# Patient Record
Sex: FEMALE | Race: WHITE | NOT HISPANIC OR LATINO | Employment: PART TIME | URBAN - METROPOLITAN AREA
[De-identification: names, ages, dates, MRNs, and addresses within clinical notes are randomized per-mention and may not be internally consistent; named-entity substitution may affect disease eponyms.]

---

## 2017-05-04 ENCOUNTER — OFFICE VISIT (OUTPATIENT)
Dept: FAMILY MEDICINE CLINIC | Facility: CLINIC | Age: 47
End: 2017-05-04

## 2017-05-04 VITALS
DIASTOLIC BLOOD PRESSURE: 80 MMHG | TEMPERATURE: 97.8 F | OXYGEN SATURATION: 99 % | SYSTOLIC BLOOD PRESSURE: 100 MMHG | HEART RATE: 82 BPM | WEIGHT: 125 LBS

## 2017-05-04 DIAGNOSIS — J01.00 ACUTE MAXILLARY SINUSITIS, RECURRENCE NOT SPECIFIED: Primary | ICD-10-CM

## 2017-05-04 DIAGNOSIS — R31.9 HEMATURIA: ICD-10-CM

## 2017-05-04 DIAGNOSIS — N39.0 ACUTE UTI: ICD-10-CM

## 2017-05-04 DIAGNOSIS — R30.9 PAIN WITH URINATION: ICD-10-CM

## 2017-05-04 DIAGNOSIS — R11.0 NAUSEA: ICD-10-CM

## 2017-05-04 DIAGNOSIS — N91.2 AMENORRHEA: ICD-10-CM

## 2017-05-04 LAB
B-HCG UR QL: NEGATIVE
BILIRUB BLD-MCNC: NEGATIVE MG/DL
CLARITY, POC: ABNORMAL
COLOR UR: ABNORMAL
GLUCOSE UR STRIP-MCNC: NEGATIVE MG/DL
INTERNAL NEGATIVE CONTROL: NEGATIVE
INTERNAL POSITIVE CONTROL: POSITIVE
KETONES UR QL: NEGATIVE
LEUKOCYTE EST, POC: ABNORMAL
Lab: NORMAL
NITRITE UR-MCNC: NEGATIVE MG/ML
PH UR: 6 [PH] (ref 5–8)
PROT UR STRIP-MCNC: NEGATIVE MG/DL
RBC # UR STRIP: ABNORMAL /UL
SP GR UR: 1.02 (ref 1–1.03)
UROBILINOGEN UR QL: ABNORMAL

## 2017-05-04 PROCEDURE — 81025 URINE PREGNANCY TEST: CPT | Performed by: PHYSICIAN ASSISTANT

## 2017-05-04 PROCEDURE — 81003 URINALYSIS AUTO W/O SCOPE: CPT | Performed by: PHYSICIAN ASSISTANT

## 2017-05-04 PROCEDURE — 99204 OFFICE O/P NEW MOD 45 MIN: CPT | Performed by: PHYSICIAN ASSISTANT

## 2017-05-04 PROCEDURE — 96372 THER/PROPH/DIAG INJ SC/IM: CPT | Performed by: PHYSICIAN ASSISTANT

## 2017-05-04 RX ORDER — CEFTRIAXONE 1 G/1
1 INJECTION, POWDER, FOR SOLUTION INTRAMUSCULAR; INTRAVENOUS EVERY 24 HOURS
Status: DISCONTINUED | OUTPATIENT
Start: 2017-05-04 | End: 2019-03-18

## 2017-05-04 RX ORDER — CEFDINIR 300 MG/1
300 CAPSULE ORAL 2 TIMES DAILY
Qty: 14 CAPSULE | Refills: 0 | Status: SHIPPED | OUTPATIENT
Start: 2017-05-04 | End: 2018-08-07

## 2017-05-04 RX ADMIN — CEFTRIAXONE 1 G: 1 INJECTION, POWDER, FOR SOLUTION INTRAMUSCULAR; INTRAVENOUS at 10:07

## 2018-08-07 ENCOUNTER — LAB (OUTPATIENT)
Dept: LAB | Facility: HOSPITAL | Age: 48
End: 2018-08-07

## 2018-08-07 ENCOUNTER — OFFICE VISIT (OUTPATIENT)
Dept: FAMILY MEDICINE CLINIC | Facility: CLINIC | Age: 48
End: 2018-08-07

## 2018-08-07 VITALS
DIASTOLIC BLOOD PRESSURE: 76 MMHG | WEIGHT: 124 LBS | SYSTOLIC BLOOD PRESSURE: 122 MMHG | BODY MASS INDEX: 21.17 KG/M2 | HEART RATE: 78 BPM | OXYGEN SATURATION: 99 % | TEMPERATURE: 98.2 F | HEIGHT: 64 IN

## 2018-08-07 DIAGNOSIS — Z98.890 HX OF LOCAL EXCISION OF SKIN LESION: ICD-10-CM

## 2018-08-07 DIAGNOSIS — Z00.00 ANNUAL PHYSICAL EXAM: ICD-10-CM

## 2018-08-07 DIAGNOSIS — Z82.49 FAMILY HISTORY OF HEART DISEASE: ICD-10-CM

## 2018-08-07 DIAGNOSIS — N95.1 PERIMENOPAUSAL SYMPTOMS: ICD-10-CM

## 2018-08-07 DIAGNOSIS — G43.809 OTHER MIGRAINE WITHOUT STATUS MIGRAINOSUS, NOT INTRACTABLE: ICD-10-CM

## 2018-08-07 DIAGNOSIS — Z00.00 ANNUAL PHYSICAL EXAM: Primary | ICD-10-CM

## 2018-08-07 LAB
ANION GAP SERPL CALCULATED.3IONS-SCNC: 6 MMOL/L (ref 3–11)
ARTICHOKE IGE QN: 159 MG/DL (ref 0–130)
BASOPHILS # BLD AUTO: 0.04 10*3/MM3 (ref 0–0.2)
BASOPHILS NFR BLD AUTO: 0.6 % (ref 0–1)
BILIRUB BLD-MCNC: NEGATIVE MG/DL
BUN BLD-MCNC: 12 MG/DL (ref 9–23)
BUN/CREAT SERPL: 12.8 (ref 7–25)
CALCIUM SPEC-SCNC: 9.5 MG/DL (ref 8.7–10.4)
CHLORIDE SERPL-SCNC: 107 MMOL/L (ref 99–109)
CHOLEST SERPL-MCNC: 238 MG/DL (ref 0–200)
CLARITY, POC: CLEAR
CO2 SERPL-SCNC: 28 MMOL/L (ref 20–31)
COLOR UR: YELLOW
CREAT BLD-MCNC: 0.94 MG/DL (ref 0.6–1.3)
DEPRECATED RDW RBC AUTO: 43.7 FL (ref 37–54)
EOSINOPHIL # BLD AUTO: 0.06 10*3/MM3 (ref 0–0.3)
EOSINOPHIL NFR BLD AUTO: 0.9 % (ref 0–3)
ERYTHROCYTE [DISTWIDTH] IN BLOOD BY AUTOMATED COUNT: 12.7 % (ref 11.3–14.5)
ESTRADIOL SERPL HS-MCNC: 27 PG/ML
GFR SERPL CREATININE-BSD FRML MDRD: 64 ML/MIN/1.73
GLUCOSE BLD-MCNC: 95 MG/DL (ref 70–100)
GLUCOSE UR STRIP-MCNC: NEGATIVE MG/DL
HCT VFR BLD AUTO: 42.2 % (ref 34.5–44)
HDLC SERPL-MCNC: 77 MG/DL (ref 40–60)
HGB BLD-MCNC: 13.6 G/DL (ref 11.5–15.5)
IMM GRANULOCYTES # BLD: 0 10*3/MM3 (ref 0–0.03)
IMM GRANULOCYTES NFR BLD: 0 % (ref 0–0.6)
KETONES UR QL: NEGATIVE
LEUKOCYTE EST, POC: NEGATIVE
LYMPHOCYTES # BLD AUTO: 2.22 10*3/MM3 (ref 0.6–4.8)
LYMPHOCYTES NFR BLD AUTO: 33.9 % (ref 24–44)
MCH RBC QN AUTO: 30.2 PG (ref 27–31)
MCHC RBC AUTO-ENTMCNC: 32.2 G/DL (ref 32–36)
MCV RBC AUTO: 93.8 FL (ref 80–99)
MONOCYTES # BLD AUTO: 0.46 10*3/MM3 (ref 0–1)
MONOCYTES NFR BLD AUTO: 7 % (ref 0–12)
NEUTROPHILS # BLD AUTO: 3.77 10*3/MM3 (ref 1.5–8.3)
NEUTROPHILS NFR BLD AUTO: 57.6 % (ref 41–71)
NITRITE UR-MCNC: NEGATIVE MG/ML
PH UR: 5 [PH] (ref 5–8)
PLATELET # BLD AUTO: 229 10*3/MM3 (ref 150–450)
PMV BLD AUTO: 12 FL (ref 6–12)
POTASSIUM BLD-SCNC: 4.2 MMOL/L (ref 3.5–5.5)
PROT UR STRIP-MCNC: NEGATIVE MG/DL
RBC # BLD AUTO: 4.5 10*6/MM3 (ref 3.89–5.14)
RBC # UR STRIP: NEGATIVE /UL
SODIUM BLD-SCNC: 141 MMOL/L (ref 132–146)
SP GR UR: 1.02 (ref 1–1.03)
TRIGL SERPL-MCNC: 84 MG/DL (ref 0–150)
TSH SERPL DL<=0.05 MIU/L-ACNC: 1.67 MIU/ML (ref 0.35–5.35)
UROBILINOGEN UR QL: NORMAL
WBC NRBC COR # BLD: 6.55 10*3/MM3 (ref 3.5–10.8)

## 2018-08-07 PROCEDURE — 36415 COLL VENOUS BLD VENIPUNCTURE: CPT

## 2018-08-07 PROCEDURE — 93000 ELECTROCARDIOGRAM COMPLETE: CPT | Performed by: PHYSICIAN ASSISTANT

## 2018-08-07 PROCEDURE — 84443 ASSAY THYROID STIM HORMONE: CPT

## 2018-08-07 PROCEDURE — 80061 LIPID PANEL: CPT

## 2018-08-07 PROCEDURE — 82670 ASSAY OF TOTAL ESTRADIOL: CPT | Performed by: PHYSICIAN ASSISTANT

## 2018-08-07 PROCEDURE — 80048 BASIC METABOLIC PNL TOTAL CA: CPT

## 2018-08-07 PROCEDURE — 81003 URINALYSIS AUTO W/O SCOPE: CPT | Performed by: PHYSICIAN ASSISTANT

## 2018-08-07 PROCEDURE — 85025 COMPLETE CBC W/AUTO DIFF WBC: CPT

## 2018-08-07 PROCEDURE — 99396 PREV VISIT EST AGE 40-64: CPT | Performed by: PHYSICIAN ASSISTANT

## 2018-08-07 RX ORDER — MULTIVITAMIN WITH IRON
TABLET ORAL
COMMUNITY
End: 2019-03-07

## 2018-08-07 RX ORDER — GRAPE SEED EXT/BIOFLAV,CITRUS 50MG-250MG
CAPSULE ORAL
COMMUNITY
End: 2019-06-19

## 2018-08-07 RX ORDER — RIZATRIPTAN BENZOATE 10 MG/1
10 TABLET ORAL ONCE AS NEEDED
Qty: 10 TABLET | Refills: 11 | Status: SHIPPED | OUTPATIENT
Start: 2018-08-07 | End: 2020-03-09 | Stop reason: SDUPTHER

## 2018-08-07 NOTE — PROGRESS NOTES
Subjective   Dora Gr is a 47 y.o. female  Annual Exam (Annual Physical Exam ) and Gynecologic Exam (Annual Pap smear )      History of Present Illness  Patient presents today for a preventive medical visit.  Patient is here to determine screening labs and tests that are due and to determine immunization status as well.  Patient will be counseled regarding preventative medicine issues such as regular exercise and  healthy diet as well.    The following portions of the patient's history were reviewed and updated as appropriate: allergies, current medications, past social history and problem list    Review of Systems   Constitutional: Negative.  Negative for fatigue and unexpected weight change.   HENT: Negative for congestion, dental problem, postnasal drip, sinus pressure and sore throat.    Eyes: Negative.  Negative for photophobia, pain and visual disturbance.   Respiratory: Negative.    Cardiovascular: Negative.    Gastrointestinal: Negative.  Negative for nausea and vomiting.   Endocrine: Positive for heat intolerance ( Hot flashes, improved with Black cohosh).   Genitourinary: Positive for menstrual problem ( Irregular menses ×1 year).   Musculoskeletal: Negative.    Skin: Negative.    Allergic/Immunologic: Negative.    Neurological: Positive for headaches ( Migraines periodically). Negative for dizziness, syncope, facial asymmetry, speech difficulty, weakness, light-headedness and numbness.   Hematological: Negative.    Psychiatric/Behavioral: Negative.  Negative for agitation, confusion, dysphoric mood and sleep disturbance. The patient is not nervous/anxious.    All other systems reviewed and are negative.      Objective     Vitals:    08/07/18 1020   BP: 122/76   Pulse: 78   Temp: 98.2 °F (36.8 °C)   SpO2: 99%       Physical Exam   Constitutional: She is oriented to person, place, and time. She appears well-developed and well-nourished.   HENT:   Head: Normocephalic and atraumatic.   Right Ear:  External ear normal.   Left Ear: External ear normal.   Nose: Nose normal.   Mouth/Throat: Oropharynx is clear and moist.   Eyes: Pupils are equal, round, and reactive to light. Conjunctivae and EOM are normal.   Neck: Normal range of motion. Neck supple. No JVD present. Carotid bruit is not present. No thyromegaly present.   Cardiovascular: Normal rate, regular rhythm, normal heart sounds and intact distal pulses.    No murmur heard.  Pulmonary/Chest: Effort normal and breath sounds normal.   Abdominal: Soft. Bowel sounds are normal. She exhibits no mass. There is no tenderness.   Genitourinary: Vagina normal and uterus normal. No vaginal discharge found.   Genitourinary Comments: Cervix clear, Pap smear done   Musculoskeletal: Normal range of motion. She exhibits no edema.   Lymphadenopathy:     She has no cervical adenopathy.   Neurological: She is alert and oriented to person, place, and time. She has normal reflexes. No cranial nerve deficit.   Skin: Skin is warm and dry.   Psychiatric: She has a normal mood and affect.   Nursing note and vitals reviewed.    ECG 12 Lead  Date/Time: 8/7/2018 11:23 AM  Performed by: MK DAMON.  Authorized by: MK DAMON   Comparison: not compared with previous ECG   Rhythm: sinus rhythm  Rate: normal  BPM: 64  Conduction: conduction normal  ST Segments: ST segments normal  T Waves: T waves normal  QRS axis: normal  Other: no other findings  Clinical impression: normal ECG            Discussed preventative medicine issues with patient including regular exercise, healthy diet, stress reduction, adequate sleep and recommended age-appropriate screening studies.  Assessment/Plan     Diagnoses and all orders for this visit:    Annual physical exam  -     POC Urinalysis Dipstick, Automated  -     TSH; Future  -     Lipid Panel; Future  -     Basic Metabolic Panel; Future  -     CBC & Differential; Future    Other migraine without status migrainosus, not intractable  -      rizatriptan (MAXALT) 10 MG tablet; Take 1 tablet by mouth 1 (One) Time As Needed for Migraine for up to 1 dose. May repeat in 2 hours if needed    Hx of local excision of skin lesion  -     Ambulatory Referral to Dermatology    Perimenopausal symptoms  -     Estradiol    Family history of heart disease    Other orders  -     Black Cohosh 540 MG capsule; Take  by mouth.  -     Magnesium 250 MG tablet; Take  by mouth.  -     ECG 12 Lead

## 2018-12-26 ENCOUNTER — OFFICE VISIT (OUTPATIENT)
Dept: FAMILY MEDICINE CLINIC | Facility: CLINIC | Age: 48
End: 2018-12-26

## 2018-12-26 VITALS
BODY MASS INDEX: 22.16 KG/M2 | SYSTOLIC BLOOD PRESSURE: 116 MMHG | HEIGHT: 64 IN | WEIGHT: 129.8 LBS | TEMPERATURE: 97.7 F | HEART RATE: 75 BPM | DIASTOLIC BLOOD PRESSURE: 70 MMHG | OXYGEN SATURATION: 99 %

## 2018-12-26 DIAGNOSIS — N39.0 ACUTE UTI: Primary | ICD-10-CM

## 2018-12-26 DIAGNOSIS — R35.0 FREQUENT URINATION: ICD-10-CM

## 2018-12-26 LAB
BILIRUB BLD-MCNC: NEGATIVE MG/DL
CLARITY, POC: ABNORMAL
COLOR UR: YELLOW
GLUCOSE UR STRIP-MCNC: NEGATIVE MG/DL
KETONES UR QL: NEGATIVE
LEUKOCYTE EST, POC: ABNORMAL
NITRITE UR-MCNC: NEGATIVE MG/ML
PH UR: 6 [PH] (ref 5–8)
PROT UR STRIP-MCNC: NEGATIVE MG/DL
RBC # UR STRIP: ABNORMAL /UL
SP GR UR: 1.02 (ref 1–1.03)
UROBILINOGEN UR QL: NORMAL

## 2018-12-26 PROCEDURE — 81003 URINALYSIS AUTO W/O SCOPE: CPT | Performed by: PHYSICIAN ASSISTANT

## 2018-12-26 PROCEDURE — 99213 OFFICE O/P EST LOW 20 MIN: CPT | Performed by: PHYSICIAN ASSISTANT

## 2018-12-26 RX ORDER — PHENAZOPYRIDINE HYDROCHLORIDE 200 MG/1
200 TABLET, FILM COATED ORAL 3 TIMES DAILY PRN
Qty: 6 TABLET | Refills: 0 | Status: SHIPPED | OUTPATIENT
Start: 2018-12-26 | End: 2019-03-07

## 2018-12-26 RX ORDER — CEFDINIR 300 MG/1
300 CAPSULE ORAL 2 TIMES DAILY
Qty: 14 CAPSULE | Refills: 0 | Status: SHIPPED | OUTPATIENT
Start: 2018-12-26 | End: 2019-03-07

## 2018-12-26 NOTE — PROGRESS NOTES
Subjective   Dora Gr is a 48 y.o. female  Urinary Frequency (Frequent urination with burning sensation over 1 week ) and Back Pain (lower back pain over 1 week )      History of Present Illness  Patient comes in today concerned with symptoms of UTI for the past week she states she's been having low back pain and low abdominal pressure with urinary frequency and burning upon urination for the past several days.  She is leaving town to go to [place] today 1 to make sure that she did not have an infection.  No fever or nausea no vomiting.  The following portions of the patient's history were reviewed and updated as appropriate: allergies, current medications, past social history and problem list    Review of Systems   Constitutional: Negative for chills and fever.   Gastrointestinal: Negative for abdominal pain, nausea and vomiting.   Genitourinary: Positive for dysuria, frequency, pelvic pain and urgency. Negative for difficulty urinating and hematuria.   Musculoskeletal: Positive for back pain.       Objective     Vitals:    12/26/18 1007   BP: 116/70   Pulse: 75   Temp: 97.7 °F (36.5 °C)   SpO2: 99%       Physical Exam   Constitutional: She appears well-developed and well-nourished.  Non-toxic appearance. She does not have a sickly appearance. She does not appear ill. No distress.   Cardiovascular: Normal rate.   Abdominal: Soft. She exhibits no distension. There is no tenderness. There is no rebound and no guarding.   Neurological: She is alert.   Skin: Skin is warm and dry. She is not diaphoretic.   Nursing note and vitals reviewed.      Assessment/Plan     Diagnoses and all orders for this visit:    Acute UTI    Frequent urination  -     POC Urinalysis Dipstick, Automated    Other orders  -     cefdinir (OMNICEF) 300 MG capsule; Take 1 capsule by mouth 2 (Two) Times a Day.  -     phenazopyridine (PYRIDIUM) 200 MG tablet; Take 1 tablet by mouth 3 (Three) Times a Day As Needed for bladder spasms.

## 2019-03-07 ENCOUNTER — OFFICE VISIT (OUTPATIENT)
Dept: FAMILY MEDICINE CLINIC | Facility: CLINIC | Age: 49
End: 2019-03-07

## 2019-03-07 VITALS
DIASTOLIC BLOOD PRESSURE: 72 MMHG | OXYGEN SATURATION: 99 % | SYSTOLIC BLOOD PRESSURE: 114 MMHG | TEMPERATURE: 98.3 F | HEART RATE: 72 BPM | BODY MASS INDEX: 22.53 KG/M2 | HEIGHT: 64 IN | WEIGHT: 132 LBS

## 2019-03-07 DIAGNOSIS — N95.1 MENOPAUSAL SYMPTOMS: ICD-10-CM

## 2019-03-07 DIAGNOSIS — N39.0 ACUTE UTI: ICD-10-CM

## 2019-03-07 DIAGNOSIS — R82.90 ABNORMAL URINE ODOR: Primary | ICD-10-CM

## 2019-03-07 LAB
BILIRUB BLD-MCNC: NEGATIVE MG/DL
CLARITY, POC: CLEAR
COLOR UR: YELLOW
GLUCOSE UR STRIP-MCNC: NEGATIVE MG/DL
KETONES UR QL: NEGATIVE
LEUKOCYTE EST, POC: ABNORMAL
NITRITE UR-MCNC: NEGATIVE MG/ML
PH UR: 6 [PH] (ref 5–8)
PROT UR STRIP-MCNC: NEGATIVE MG/DL
RBC # UR STRIP: ABNORMAL /UL
SP GR UR: 1.03 (ref 1–1.03)
UROBILINOGEN UR QL: NORMAL

## 2019-03-07 PROCEDURE — 99213 OFFICE O/P EST LOW 20 MIN: CPT | Performed by: PHYSICIAN ASSISTANT

## 2019-03-07 PROCEDURE — 81003 URINALYSIS AUTO W/O SCOPE: CPT | Performed by: PHYSICIAN ASSISTANT

## 2019-03-07 RX ORDER — CEFDINIR 300 MG/1
300 CAPSULE ORAL 2 TIMES DAILY
Qty: 14 CAPSULE | Refills: 0 | Status: SHIPPED | OUTPATIENT
Start: 2019-03-07 | End: 2019-03-18

## 2019-03-07 NOTE — PROGRESS NOTES
Subjective   Dora Gr is a 48 y.o. female  Back Pain (lower back pain x2 weeks ) and urine odor (urine odor x2 weeks )      History of Present Illness  Patient comes in for evaluation of low back pain with increased urinary odor for the last 2 weeks.  No pain with urination.  She has been feeling low energy through the weekend.  Last UTI was in December she states her symptoms all went away.  She states she still with a lot of menopausal symptoms of hot flashes and vaginal dryness despite using OTC herbal treatments.  She is interested into looking at other options.  The following portions of the patient's history were reviewed and updated as appropriate: allergies, current medications, past social history and problem list    Review of Systems   Constitutional: Negative for chills and fever.   Gastrointestinal: Negative for abdominal pain, nausea and vomiting.   Genitourinary: Positive for dyspareunia, frequency and urgency. Negative for difficulty urinating, dysuria and hematuria.   Musculoskeletal: Positive for back pain.       Objective     Vitals:    03/07/19 1454   BP: 114/72   Pulse: 72   Temp: 98.3 °F (36.8 °C)   SpO2: 99%       Physical Exam   Constitutional: She appears well-developed and well-nourished. No distress.   Abdominal: Soft. She exhibits no distension. There is no tenderness. There is no rebound and no guarding.   Skin: Skin is warm and dry. She is not diaphoretic. No pallor.   Nursing note and vitals reviewed.      Assessment/Plan     Diagnoses and all orders for this visit:    Abnormal urine odor  -     POC Urinalysis Dipstick, Automated    Menopausal symptoms  -     Ambulatory Referral to Gynecology    Acute UTI    Other orders  -     cefdinir (OMNICEF) 300 MG capsule; Take 1 capsule by mouth 2 (Two) Times a Day.

## 2019-03-18 ENCOUNTER — OFFICE VISIT (OUTPATIENT)
Dept: OBSTETRICS AND GYNECOLOGY | Facility: CLINIC | Age: 49
End: 2019-03-18

## 2019-03-18 ENCOUNTER — APPOINTMENT (OUTPATIENT)
Dept: LAB | Facility: HOSPITAL | Age: 49
End: 2019-03-18

## 2019-03-18 VITALS
BODY MASS INDEX: 21.83 KG/M2 | WEIGHT: 131 LBS | SYSTOLIC BLOOD PRESSURE: 114 MMHG | DIASTOLIC BLOOD PRESSURE: 70 MMHG | HEIGHT: 65 IN

## 2019-03-18 DIAGNOSIS — N95.1 MENOPAUSAL SYMPTOMS: ICD-10-CM

## 2019-03-18 DIAGNOSIS — N30.00 ACUTE CYSTITIS WITHOUT HEMATURIA: ICD-10-CM

## 2019-03-18 DIAGNOSIS — N94.10 DYSPAREUNIA, FEMALE: Primary | ICD-10-CM

## 2019-03-18 LAB
BACTERIA UR QL AUTO: ABNORMAL /HPF
BILIRUB UR QL STRIP: NEGATIVE
CLARITY UR: ABNORMAL
COLOR UR: YELLOW
GLUCOSE UR STRIP-MCNC: NEGATIVE MG/DL
HGB UR QL STRIP.AUTO: NEGATIVE
HYALINE CASTS UR QL AUTO: ABNORMAL /LPF
KETONES UR QL STRIP: NEGATIVE
LEUKOCYTE ESTERASE UR QL STRIP.AUTO: ABNORMAL
NITRITE UR QL STRIP: NEGATIVE
PH UR STRIP.AUTO: 7 [PH] (ref 5–8)
PROT UR QL STRIP: NEGATIVE
RBC # UR: ABNORMAL /HPF
REF LAB TEST METHOD: ABNORMAL
SP GR UR STRIP: 1.02 (ref 1–1.03)
SQUAMOUS #/AREA URNS HPF: ABNORMAL /HPF
UROBILINOGEN UR QL STRIP: ABNORMAL
WBC UR QL AUTO: ABNORMAL /HPF

## 2019-03-18 PROCEDURE — 99202 OFFICE O/P NEW SF 15 MIN: CPT | Performed by: OBSTETRICS & GYNECOLOGY

## 2019-03-18 PROCEDURE — 87186 SC STD MICRODIL/AGAR DIL: CPT | Performed by: OBSTETRICS & GYNECOLOGY

## 2019-03-18 PROCEDURE — 87077 CULTURE AEROBIC IDENTIFY: CPT | Performed by: OBSTETRICS & GYNECOLOGY

## 2019-03-18 PROCEDURE — 87086 URINE CULTURE/COLONY COUNT: CPT | Performed by: OBSTETRICS & GYNECOLOGY

## 2019-03-18 PROCEDURE — 81001 URINALYSIS AUTO W/SCOPE: CPT | Performed by: OBSTETRICS & GYNECOLOGY

## 2019-03-18 NOTE — PROGRESS NOTES
Subjective   Chief Complaint   Patient presents with   • Dyspareunia   • Hot Flashes     freg uti's     Dora Gr is a 48 y.o. year old .  No LMP recorded. Patient is not currently having periods (Reason: Other).  She presents to be seen because of symptomatic hot flashes.  She is having 5-10 of these during the day and 5 or 10 of these at nighttime.  Making life miserable for her.  She is having burning during intercourse and has had frequent bladder infections probably 3 in the past 10 months.  Recently treated with antibiotics.  I think be a good idea to urinalysis today.  Initially she took some black cohosh not helped until about 6 months ago.  I told her that is not atypical probably should go ahead and discontinue that would need to have some estrogens.  She is a  I discussed the WHI study and she understands relative risk will only change things from 140-1.2 and 40 at age 60 regarding breast cancer.  Would put her on progesterone which have a side effect of may be making her sleepy to help protect the endometrium.  Discussed may need use of vaginal estrogen for dyspareunia until the systemic estrogen is more effective.  Discussed transdermal estrogen has negligible effect on blood clotting..    She does do breast examination is up-to-date on mammogram.  Pap test normal 2019  Exercise a regular basis weights 2 times a week.  2 calcium servings a day  Caffeine is 1/2 cups coffee may be a tea per day.                      The following portions of the patient's history were reviewed and updated as appropriate:problem list, current medications, allergies, past family history, past medical history, past social history and past surgical history   GYN questionnaire is negative x7+ for ovarian cyst.  Obstetrical history she has never been pregnant.  Adopted 2 children.  No prior surgery.  Medical history negative x8.  System review negative x11+ for headaches.  Social history she  "is  does not smoke or use illegal drugs.  2-3 alcoholic beverages per week.  She is a .  Family history is negative for breast, ovarian cancer or colon cancer.  She does not think there is any osteoporosis.  Her mother is 78 years old not stooped etc.         Objective   /70   Ht 163.8 cm (64.5\")   Wt 59.4 kg (131 lb)   BMI 22.14 kg/m²     General:  well developed; well nourished  no acute distress  appears stated age   Skin:  No suspicious lesions seen   Thyroid: not examined   Lungs:  breathing is unlabored   Heart:  Not performed.   Abdomen: soft, non-tender; no masses  no umbilical or inguinal hernias are present  no hepato-splenomegaly  Minimal tenderness midline no CVAT   Pelvis: Clinical staff was present for exam  External genitalia:  normal appearance of the external genitalia including Bartholin's and [place]'s glands.  :  urethral meatus normal;  Vaginal:  atrophic mucosal changes are present; pH = 5.5  Uterus:  normal size, shape and consistency. anteverted; Nontender  Adnexa:  non palpable bilaterally.   Bladder is somewhat tender to examination.     Lab Review   CMP, LIPIDS, TSH and Pap test    Imaging   Mammogram report       Assessment   1. Menopausal symptoms with hot flashes night sweats.  We will try Estrogel and Prometrium at night.  2. Dyspareunia could be related to increased vaginal pH will give samples of Premarin and try this for a few months hopefully be able to wean off of this once Estrogel is effective.  3. Possible interstitial cystitis?  I will give her a diet information sheet.  4. History of what sounds like maybe 3 UTIs since May 2018.  Also could be interstitial cystitis which mimics these will check urinalysis to be sure that this is cleared up.     Plan   1.  As above  We will ask her to discontinue the black cohosh as its ineffective  Trial of Premarin every other night and then may be 2-3 times a week for months until effective then she may be " able to wean off of that.  Interstitial cystitis diet  information  Follow-up in 3 months             Orders Placed This Encounter   Procedures   • Urinalysis With Microscopic - Urine, Clean Catch     New Medications Ordered This Visit   Medications   • Estradiol (ESTROGEL) 0.75 MG/1.25 GM (0.06%) topical gel     Sig: Place 1.25 g on the skin as directed by provider Daily.     Dispense:  1 bottle     Refill:  12   • progesterone (PROMETRIUM) 200 MG capsule     Sig: Take 1 capsule by mouth Daily.     Dispense:  30 capsule     Refill:  11   • conjugated estrogens (PREMARIN) 0.625 MG/GM vaginal cream     Sig: Use 0.5  grams intravaginally 2 times weekly     Dispense:  1 each     Refill:  4          I spent a total of 20 minutes, greater than half the time was in counseling the patient.  This note was electronically signed.    Dave Benavidez MD  March 18, 2019

## 2019-03-19 NOTE — PROGRESS NOTES
She has a history of 3 UTIs in the last 8 or 9 months.  This appears contaminated can they run a culture?  Thank you

## 2019-03-19 NOTE — PROGRESS NOTES
Thanks may be let her know that were going to do the culture to be sure whether or not there was a bladder infection.  Could be vaginal contamination thank you

## 2019-03-20 ENCOUNTER — TELEPHONE (OUTPATIENT)
Dept: OBSTETRICS AND GYNECOLOGY | Facility: CLINIC | Age: 49
End: 2019-03-20

## 2019-03-20 DIAGNOSIS — N30.00 ACUTE CYSTITIS WITHOUT HEMATURIA: Primary | ICD-10-CM

## 2019-03-20 PROBLEM — N39.0 UTI (URINARY TRACT INFECTION): Status: ACTIVE | Noted: 2019-03-20

## 2019-03-20 RX ORDER — NITROFURANTOIN 25; 75 MG/1; MG/1
100 CAPSULE ORAL 2 TIMES DAILY
Qty: 14 CAPSULE | Refills: 0 | Status: SHIPPED | OUTPATIENT
Start: 2019-03-20 | End: 2019-03-27

## 2019-03-20 NOTE — TELEPHONE ENCOUNTER
Pt is calling she was in the office and has just received a call from her pharmacy about a medication that has been called into her pharmacy - she is not sure why and what this is for can a nurse please call her and go over this with her   [phone]

## 2019-03-20 NOTE — PROGRESS NOTES
Please let her know that the urine culture is growing gram-negative bacilli.  Veena Richard has given her Omnicef in the past x3.  I will try Macrobid twice daily for a week.  Given the above I think it would be a good idea for her to come back in for follow-up urinalysis in 1 week after completing the Macrobid.  I will place that order as well.

## 2019-03-21 LAB — BACTERIA SPEC AEROBE CULT: ABNORMAL

## 2019-03-21 NOTE — PROGRESS NOTES
Please make a note to check to be sure that she gets the follow-up urinalysis done next week.  The E. coli is susceptible to the Macrobid I placed her on.  Thank you

## 2019-03-22 ENCOUNTER — TELEPHONE (OUTPATIENT)
Dept: OBSTETRICS AND GYNECOLOGY | Facility: CLINIC | Age: 49
End: 2019-03-22

## 2019-03-22 DIAGNOSIS — N94.10 DYSPAREUNIA, FEMALE: Primary | ICD-10-CM

## 2019-03-22 NOTE — TELEPHONE ENCOUNTER
----- Message from Dave Benavidez MD sent at 3/21/2019  2:49 PM EDT -----  Please make a note to check to be sure that she gets the follow-up urinalysis done next week.  The E. coli is susceptible to the Macrobid I placed her on.  Thank you

## 2019-04-02 ENCOUNTER — LAB (OUTPATIENT)
Dept: LAB | Facility: HOSPITAL | Age: 49
End: 2019-04-02

## 2019-04-02 DIAGNOSIS — N30.00 ACUTE CYSTITIS WITHOUT HEMATURIA: ICD-10-CM

## 2019-04-02 DIAGNOSIS — N94.10 DYSPAREUNIA, FEMALE: ICD-10-CM

## 2019-04-02 LAB
BILIRUB UR QL STRIP: NEGATIVE
CLARITY UR: CLEAR
COLOR UR: YELLOW
GLUCOSE UR STRIP-MCNC: NEGATIVE MG/DL
HGB UR QL STRIP.AUTO: NEGATIVE
KETONES UR QL STRIP: NEGATIVE
LEUKOCYTE ESTERASE UR QL STRIP.AUTO: NEGATIVE
NITRITE UR QL STRIP: NEGATIVE
PH UR STRIP.AUTO: 6 [PH] (ref 5–8)
PROT UR QL STRIP: NEGATIVE
SP GR UR STRIP: 1.02 (ref 1–1.03)
UROBILINOGEN UR QL STRIP: NORMAL

## 2019-04-02 PROCEDURE — 81003 URINALYSIS AUTO W/O SCOPE: CPT

## 2019-06-19 ENCOUNTER — OFFICE VISIT (OUTPATIENT)
Dept: OBSTETRICS AND GYNECOLOGY | Facility: CLINIC | Age: 49
End: 2019-06-19

## 2019-06-19 VITALS
SYSTOLIC BLOOD PRESSURE: 110 MMHG | BODY MASS INDEX: 22.65 KG/M2 | DIASTOLIC BLOOD PRESSURE: 60 MMHG | WEIGHT: 134 LBS | RESPIRATION RATE: 16 BRPM

## 2019-06-19 DIAGNOSIS — N95.1 MENOPAUSAL SYMPTOMS: ICD-10-CM

## 2019-06-19 DIAGNOSIS — N94.10 DYSPAREUNIA, FEMALE: Primary | ICD-10-CM

## 2019-06-19 PROCEDURE — 99213 OFFICE O/P EST LOW 20 MIN: CPT | Performed by: OBSTETRICS & GYNECOLOGY

## 2019-06-19 RX ORDER — TRETINOIN 0.5 MG/G
CREAM TOPICAL
Refills: 1 | COMMUNITY
Start: 2019-06-04 | End: 2020-03-09

## 2019-06-19 NOTE — PROGRESS NOTES
Subjective   Chief Complaint   Patient presents with   • Follow-up     medication     Dora Gr is a 48 y.o. year old .  No LMP recorded. Patient is postmenopausal.  She presents to be seen because of history of recurrent UTIs.  She has not had any symptoms in the last 3 months.  She was treated with Macrobid at that time.  She is not on any suppression.  In the 8-month prior to March she had about 4-5 UTIs over that period of time.  Her dyspareunia has improved on the vaginal estrogen in addition to the Estrogel.  Discussed that we probably can wean that down from twice a week to may be every 5 days through the end of July then August maybe once a week.  If she is doing well without dyspareunia than I would stop the Premarin cream altogether.  She requests that we send in the long-term Prometrium and Estrogel to [facility].                    The following portions of the patient's history were reviewed and updated as appropriate:She  has no past medical history on file.  She does not have any pertinent problems on file.  She  has no past surgical history on file.  Her family history includes Cancer in her father; Heart disease in her father.  She  reports that she has never smoked. She has never used smokeless tobacco. She reports that she does not drink alcohol or use drugs.  She has No Known Allergies.    Current Outpatient Medications:   •  conjugated estrogens (PREMARIN) 0.625 MG/GM vaginal cream, Use 0.5  grams intravaginally 2 times weekly, Disp: 1 each, Rfl: 4  •  Estradiol (ESTROGEL) 0.75 MG/1.25 GM (0.06%) topical gel, Place 1.25 g on the skin as directed by provider Daily., Disp: 3 bottle, Rfl: 1  •  progesterone (PROMETRIUM) 200 MG capsule, Take 1 capsule by mouth Daily., Disp: 90 capsule, Rfl: 1  •  rizatriptan (MAXALT) 10 MG tablet, Take 1 tablet by mouth 1 (One) Time As Needed for Migraine for up to 1 dose. May repeat in 2 hours if needed, Disp: 10 tablet, Rfl: 11  •  tretinoin  (RETIN-A) 0.05 % cream, , Disp: , Rfl: 1  no or minimal alcohol, nonsmoker, no or mild caffeine use  Review of Systems      Objective   /60   Resp 16   Wt 60.8 kg (134 lb)   Breastfeeding? No   BMI 22.65 kg/m²     General:  well developed; well nourished  no acute distress  appears stated age   Skin:  No suspicious lesions seen   Thyroid: not examined   Lungs:  breathing is unlabored   Heart:  Not performed.   Abdomen: soft, non-tender; no masses  no umbilical or inguinal hernias are present  no hepato-splenomegaly   Pelvis: Not performed.     Lab Review   BMP, CBC, LIPIDS and UA    Imaging   No data reviewed       Assessment   1. History of UTIs which has improved.  She is not on the interstitial cystitis diet.  2. Dyspareunia has improved using combination of estradiol and Premarin cream which we will wean as discussed above  She reports that she had a Pap smear done last August when she had all her laboratory done lipids etc. which I reviewed.  Unfortunately do not see the report of the Pap test under lab section or in the media section.  Told her I did stop that I did not believe her chest that I can find results.  We will have her go back to see Ninfa Nathan PA-C for her annual in the fall and let her clear up any confusion.  Discussed the normal Pap co-testing can be repeated every 3 years.  She reports she is never had an abnormal Pap smear.  Plan   1.  Continue weaning off Premarin cream while Estrogel is effective  2.    Follow-up for annual examination about 3 months with primary care.  I will let her clear up any confusion regarding Pap test.      No orders of the defined types were placed in this encounter.    New Medications Ordered This Visit   Medications   • progesterone (PROMETRIUM) 200 MG capsule     Sig: Take 1 capsule by mouth Daily.     Dispense:  90 capsule     Refill:  1   • Estradiol (ESTROGEL) 0.75 MG/1.25 GM (0.06%) topical gel     Sig: Place 1.25 g on the skin as directed by  provider Daily.     Dispense:  3 bottle     Refill:  1            .  This note was electronically signed.    Dave Benavidez MD  June 19, 2019

## 2019-06-25 ENCOUNTER — TELEPHONE (OUTPATIENT)
Dept: OBSTETRICS AND GYNECOLOGY | Facility: CLINIC | Age: 49
End: 2019-06-25

## 2019-06-25 NOTE — TELEPHONE ENCOUNTER
PT IS TRYING TO GET HER ESROGEL - FILLED - NOW [facility] CANT FILL DUE TO BEING ON BACK ORDER- IS THERE ANOTHER OPTION    PLEASE CALL  [phone]

## 2019-06-26 ENCOUNTER — TELEPHONE (OUTPATIENT)
Dept: OBSTETRICS AND GYNECOLOGY | Facility: CLINIC | Age: 49
End: 2019-06-26

## 2019-07-02 ENCOUNTER — TELEPHONE (OUTPATIENT)
Dept: OBSTETRICS AND GYNECOLOGY | Facility: CLINIC | Age: 49
End: 2019-07-02

## 2020-03-09 ENCOUNTER — OFFICE VISIT (OUTPATIENT)
Dept: OBSTETRICS AND GYNECOLOGY | Facility: CLINIC | Age: 50
End: 2020-03-09

## 2020-03-09 VITALS
WEIGHT: 132 LBS | SYSTOLIC BLOOD PRESSURE: 116 MMHG | HEIGHT: 65 IN | DIASTOLIC BLOOD PRESSURE: 70 MMHG | BODY MASS INDEX: 21.99 KG/M2

## 2020-03-09 DIAGNOSIS — Z01.419 ENCOUNTER FOR WELL WOMAN EXAM WITH ROUTINE GYNECOLOGICAL EXAM: Primary | ICD-10-CM

## 2020-03-09 DIAGNOSIS — G43.809 OTHER MIGRAINE WITHOUT STATUS MIGRAINOSUS, NOT INTRACTABLE: ICD-10-CM

## 2020-03-09 DIAGNOSIS — N95.1 MENOPAUSAL SYMPTOMS: ICD-10-CM

## 2020-03-09 PROBLEM — N94.10 DYSPAREUNIA, FEMALE: Status: RESOLVED | Noted: 2019-03-18 | Resolved: 2020-03-09

## 2020-03-09 PROCEDURE — 99396 PREV VISIT EST AGE 40-64: CPT | Performed by: OBSTETRICS & GYNECOLOGY

## 2020-03-09 RX ORDER — RIZATRIPTAN BENZOATE 10 MG/1
10 TABLET ORAL ONCE AS NEEDED
Qty: 10 TABLET | Refills: 5 | Status: SHIPPED | OUTPATIENT
Start: 2020-03-09 | End: 2020-03-09

## 2020-03-09 RX ORDER — RIZATRIPTAN BENZOATE 10 MG/1
10 TABLET ORAL ONCE AS NEEDED
Qty: 30 TABLET | Refills: 3 | Status: SHIPPED | OUTPATIENT
Start: 2020-03-09 | End: 2021-03-09

## 2020-03-09 NOTE — PROGRESS NOTES
Subjective   Chief Complaint   Patient presents with   • Annual Exam   • Hot Flashes     trouble sleeping     Dora Gr is a 49 y.o. year old  menopausal female presenting to be seen for her annual exam.  There has not been vaginal bleeding in the last 12 months.  Hot flashes and night sweats ARE a significant problem.  This is while she is on the Evamist.  It does not seem to work as well as Estrogel.  She only switch because there may been shortage of the Estrogel.  She would like to go back on the more effective Estrogel and I think that is reasonable.  Regarding her migraine she had about 15 in February which is unusual normally she has 3 or 4 in the Maxalt 1 or 2 works.  We will try to send those in mail order as well.    SEXUAL Hx:  She is sexually active.  Vaginal dryness is not a problem.  Tensed is painful:no  She has concerns about domestic violence: no    HEALTH Hx:  She exercises regularly: yes.  She wears her seat belt:yes.  Self breast awareness: yes  She has noticed changes in height: no              Calcium intake is not adequate 2 daily              Caffeine intake: caffeine use is moderate-to-high daily    The following portions of the patient's history were reviewed and updated as appropriate:problem list, current medications, allergies, past family history, past medical history, past social history and past surgical history.    Current Outpatient Medications:   •  Estradiol (ESTROGEL) 0.75 MG/1.25 GM (0.06%) topical gel, Place 1.25 g on the skin as directed by provider Daily., Disp: 3 bottle, Rfl: 3  •  progesterone (PROMETRIUM) 200 MG capsule, Take 1 capsule by mouth Daily., Disp: 90 capsule, Rfl: 3  •  conjugated estrogens (PREMARIN) 0.625 MG/GM vaginal cream, Use 0.5  grams intravaginally 2 times weekly, Disp: 1 each, Rfl: 4  •  rizatriptan (MAXALT) 10 MG tablet, Take 1 tablet by mouth 1 (One) Time As Needed for Migraine. May repeat in 2 hours if needed, Disp: 30 tablet, Rfl:  "3    Social History    Tobacco Use      Smoking status: Never Smoker      Smokeless tobacco: Never Used    Social History     Substance and Sexual Activity   Alcohol Use Yes   • Alcohol/week: 2.0 standard drinks   • Types: 2 Glasses of wine per week       Review of systems  Constitutional   POS night sweats and hot flashes ; migraines inc in Eb 15 /month                           NEG chills, fatigue and fevers  Breast               POS nothing reported                           NEG persistent breast lump or nipple discharge  GI                     POS nothing reported                           NEG bloating, change in bowel habits, melena or reflux symptoms                      POS nothing reported                           NEG dysuria, frequency or hematuria         Objective   /70   Ht 165.1 cm (65\")   Wt 59.9 kg (132 lb)   Breastfeeding No   BMI 21.97 kg/m²      General:  well developed; well nourished  no acute distress  appears stated age   Skin:  No suspicious lesions seen   Thyroid: normal to inspection and palpation   Breasts:  Examined in supine position  Symmetric without masses or skin dimpling  Nipples normal without inversion, lesions or discharge  Fibrocystic changes are present both breasts without a discrete mass   Abdomen: soft, non-tender; no masses  no umbilical or inguinal hernias are present  no hepato-splenomegaly   Pelvis: Clinical staff was present for exam  External genitalia:  normal appearance of the external genitalia including Bartholin's and [place]'s glands.  :  urethral meatus normal;  Vaginal:  normal pink mucosa without prolapse or lesions.  Uterus:  normal size, shape and consistency. anteverted;  Adnexa:  non palpable bilaterally.  Rectal:  digital rectal exam not performed; anus visually normal appearing.       Lab Review   CBC, LIPIDS, TSH and UA  Imaging review  Mammogram report       Assessment   1. Normal GYN examination; did better on Estrogel as opposed to the " Evamist.  Discussed she could use to the Evamist sprays but would rather just use Estrogel think that is very reasonable.  2. Migraine stable with the exception of February when she had more than usual.  The [place] tend to be cyclical even though she is not having regular menses.       Plan   1. Annual or sooner as needed  2. Calcium discussed  1200 mg daily in divided doses ideally in diet  3. Regular weight bearing exercise  4. Breast self awareness, mammograms discussed when these are done at   5. Colonoscopy discussed by age 50  6. We will switch back to Estrogel from Evamist continue Prometrium at night she is no longer using Premarin cream    New Medications Ordered This Visit   Medications   • Estradiol (ESTROGEL) 0.75 MG/1.25 GM (0.06%) topical gel     Sig: Place 1.25 g on the skin as directed by provider Daily.     Dispense:  3 bottle     Refill:  3   • progesterone (PROMETRIUM) 200 MG capsule     Sig: Take 1 capsule by mouth Daily.     Dispense:  90 capsule     Refill:  3   • rizatriptan (MAXALT) 10 MG tablet     Sig: Take 1 tablet by mouth 1 (One) Time As Needed for Migraine. May repeat in 2 hours if needed     Dispense:  30 tablet     Refill:  3      No orders of the defined types were placed in this encounter.             This note was electronically signed.    Dave Benavidez M.D.  March 9, 2020

## 2021-03-08 ENCOUNTER — TELEPHONE (OUTPATIENT)
Dept: FAMILY MEDICINE CLINIC | Facility: CLINIC | Age: 51
End: 2021-03-08

## 2021-03-08 NOTE — TELEPHONE ENCOUNTER
Caller: Dora Gr    Relationship: Self    Best call back number: [phone]    What orders are you requesting (i.e. lab or imaging): REFERRAL TO DR SHANICE HUFFMAN, REFERRAL FOR [facility]     In what timeframe would the patient need to come in: ASAP    Where will you receive your lab/imaging services:  [facility]    Additional notes: DERMATOLOGY IS NOT IN

## 2021-03-12 ENCOUNTER — TELEMEDICINE (OUTPATIENT)
Dept: FAMILY MEDICINE CLINIC | Facility: CLINIC | Age: 51
End: 2021-03-12

## 2021-03-12 DIAGNOSIS — N95.9 MENOPAUSAL AND POSTMENOPAUSAL DISORDER: ICD-10-CM

## 2021-03-12 DIAGNOSIS — L98.9 SKIN LESION OF CHEST WALL: ICD-10-CM

## 2021-03-12 DIAGNOSIS — Z12.11 COLON CANCER SCREENING: Primary | ICD-10-CM

## 2021-03-12 PROCEDURE — 99213 OFFICE O/P EST LOW 20 MIN: CPT | Performed by: PHYSICIAN ASSISTANT

## 2021-03-12 NOTE — PROGRESS NOTES
Subjective   Dora Gr is a 50 y.o. female  Suspicious Skin Lesion and Menopause      History of Present Illness  Patient is a very pleasant 50-year-old white female who presents today via video visit have given her consent for severe office visit.  She is needing a few referrals.  She is due for her screening colonoscopy based on her age.  She is asymptomatic.  She also is wanting referral back to her dermatologist and likely dermatology because of a skin lesion on her chest wall that is irritated and has not healed over a period of several months.  She is also requesting referral back to her gynecologist as she has continued to suffer for menopausal symptoms of hot flashes waking her person 4 times a night on estrogen replacement therapy.  She is going to run out of her progesterone within the next week and request refill that at her current dosage for now until she sees her gynecologist as well.  Video visit today 15 minutes in length  The following portions of the patient's history were reviewed and updated as appropriate: allergies, current medications, past social history and problem list    Review of Systems   Constitutional: Negative for fever.   Respiratory: Negative.    Cardiovascular: Negative.    Gastrointestinal: Negative.    Endocrine: Positive for heat intolerance.   Musculoskeletal: Negative for arthralgias.   Skin: Positive for color change. Negative for pallor, rash and wound.   Psychiatric/Behavioral: Positive for sleep disturbance.       Objective     There were no vitals filed for this visit.    Physical Exam  Constitutional:       General: She is not in acute distress.     Appearance: Normal appearance. She is well-developed and normal weight. She is not toxic-appearing or diaphoretic.   HENT:      Head: Normocephalic and atraumatic.   Pulmonary:      Effort: Pulmonary effort is normal. No respiratory distress.   Skin:     General: Skin is warm and dry.      Coloration: Skin is not pale.       Findings: No erythema or rash.      Comments: Scaly appearing tan skin lesion looks approximately half centimeter in size on right upper chest wall   Neurological:      Mental Status: She is alert and oriented to person, place, and time.   Psychiatric:         Mood and Affect: Mood normal.         Behavior: Behavior normal.         Thought Content: Thought content normal.         Judgment: Judgment normal.         Assessment/Plan     Diagnoses and all orders for this visit:    1. Colon cancer screening (Primary)  -     Ambulatory Referral For Screening Colonoscopy    2. Menopausal and postmenopausal disorder  -     Ambulatory Referral to Gynecology    3. Skin lesion of chest wall  -     Ambulatory Referral to Dermatology    Other orders  -     progesterone (PROMETRIUM) 200 MG capsule; Take 1 capsule by mouth Daily.  Dispense: 30 capsule; Refill: 0

## 2021-03-17 ENCOUNTER — OFFICE VISIT (OUTPATIENT)
Dept: OBSTETRICS AND GYNECOLOGY | Facility: CLINIC | Age: 51
End: 2021-03-17

## 2021-03-17 VITALS
SYSTOLIC BLOOD PRESSURE: 116 MMHG | DIASTOLIC BLOOD PRESSURE: 70 MMHG | RESPIRATION RATE: 16 BRPM | WEIGHT: 138 LBS | BODY MASS INDEX: 22.96 KG/M2

## 2021-03-17 DIAGNOSIS — N95.1 MENOPAUSAL SYMPTOMS: ICD-10-CM

## 2021-03-17 DIAGNOSIS — Z01.419 ENCOUNTER FOR WELL WOMAN EXAM WITH ROUTINE GYNECOLOGICAL EXAM: Primary | ICD-10-CM

## 2021-03-17 PROBLEM — N39.0 UTI (URINARY TRACT INFECTION): Status: RESOLVED | Noted: 2019-03-20 | Resolved: 2021-03-17

## 2021-03-17 PROCEDURE — 99396 PREV VISIT EST AGE 40-64: CPT | Performed by: OBSTETRICS & GYNECOLOGY

## 2021-03-17 RX ORDER — ESTRADIOL 0.75 MG/1.25G
2.5 GEL, METERED TOPICAL DAILY
Qty: 300 G | Refills: 3 | Status: SHIPPED | OUTPATIENT
Start: 2021-03-17 | End: 2022-03-24 | Stop reason: SDUPTHER

## 2021-03-17 RX ORDER — ESTRADIOL 0.75 MG/1.25G
1.25 GEL, METERED TOPICAL DAILY
COMMUNITY
End: 2021-03-17 | Stop reason: SDUPTHER

## 2021-03-17 NOTE — PROGRESS NOTES
Subjective   Chief Complaint   Patient presents with   • Menopause     referred by Veena Nathan   • Annual Exam     Dora Gr is a 50 y.o. year old  menopausal female presenting to be seen for her annual exam.  There has not been vaginal bleeding in the last 12 months.  Hot flashes and night sweats ARE a significant problem.4 night sweats waking her up and interferes with ADL she has been using the estradiol gel at night along with Prometrium.  This is contributing to some fatigue not getting good nights rest.    SEXUAL Hx:  She is sexually active.  Vaginal dryness is not a problem.as long on ERT  [place] is painful:yes no UTI o EST  She has concerns about domestic violence: no  Discussed risk for STD and sexual behavior.    HEALTH Hx:  She exercises regularly: yes.  She wears her seat belt:yes.  Self breast awareness: yes  She has noticed changes in height: no              Calcium intake is not adequate 2 daily              Caffeine intake: caffeine use is moderate  daily              Discussed immunizations, screenings, mental and dental health.    The following portions of the patient's history were reviewed and updated as appropriate:problem list, current medications, allergies, past family history, past medical history, past social history and past surgical history.      Current Outpatient Medications:   •  Estradiol (Estrogel) 0.75 MG/1.25 GM (0.06%) topical gel, Place 2.5 g on the skin as directed by provider Daily. Use 2 applications nightly, Disp: 300 g, Rfl: 3  •  progesterone (PROMETRIUM) 200 MG capsule, Take 1 capsule by mouth Daily., Disp: 90 capsule, Rfl: 3    Social History    Tobacco Use      Smoking status: Never Smoker      Smokeless tobacco: Never Used    Social History     Substance and Sexual Activity   Alcohol Use Yes   • Alcohol/week: 2.0 standard drinks   • Types: 2 Glasses of wine per week     Discussed avoidance of illicit drugs    Review of systems  Constitutional   POS  weight gain and night sweats some fatigue due to lack of sleep                           NEG chills, fatigue, fevers and malaise;  Breast               POS nothing reported                           NEG persistent breast lump, skin dimpling or nipple discharge  GI                     POS nothing reported                           NEG bloating, change in bowel habits, melena or reflux symptoms                      POS nothing reported                           NEG dysuria, frequency or hematuria           Objective   /70   Resp 16   Wt 62.6 kg (138 lb)   Breastfeeding No   BMI 22.96 kg/m²      General:  well developed; well nourished  no acute distress  appears stated age   Skin:  No suspicious lesions seen   Thyroid: not examined   Breasts:  Examined in supine position  Symmetric without masses or skin dimpling  Nipples normal without inversion, lesions or discharge  Fibrocystic changes are present both breasts without a discrete mass   Abdomen: soft, non-tender; no masses  no umbilical or inguinal hernias are present  no hepato-splenomegaly   Pelvis: Clinical staff was present for exam  External genitalia:  normal appearance of the external genitalia including Barth[place]'s and [place]'s glands.  :  urethral meatus normal;  Vaginal:  normal pink mucosa without prolapse or lesions.  Cervix:  normal appearance. stenotic; friable; Pap obtained  Uterus:  normal size, shape and consistency. anteverted;  Adnexa:  non palpable bilaterally.  Rectal:  digital rectal exam not performed; anus visually normal appearing.       Lab Review   TSH, UA and Urine culture estradiol  Imaging review  Mammogram report               Assessment     1. Normal postmenopausal examination with exception of persistent night sweats on Estrogel once a night.  Will increase to twice or 2 applications daily.  2.  She has not had a lab work screening.  Recent exam with primary care was televisit.    Colonoscopy was ordered  Pap cotesting  now up-to-date and she is up-to-date on mammograms.       Plan     1. Annual or sooner as needed; follow-up Pap cotesting.  Discussed that I will be retiring and we will refer her to one of my partners.  2. Calcium discussed  1200 mg daily in divided doses ideally in diet  3. Regular weight bearing exercise, nutrition, injury avoidance and maintaining healthy weight discussed  4. Breast self awareness and mammograms discussed  5. Colonoscopy ordered by primary care  6.     New Medications Ordered This Visit   Medications   • Estradiol (Estrogel) 0.75 MG/1.25 GM (0.06%) topical gel     Sig: Place 2.5 g on the skin as directed by provider Daily. Use 2 applications nightly     Dispense:  300 g     Refill:  3   • progesterone (PROMETRIUM) 200 MG capsule     Sig: Take 1 capsule by mouth Daily.     Dispense:  90 capsule     Refill:  3     **Patient requests 90 days supply**      Orders Placed This Encounter   Procedures   • Hepatitis C Antibody   • TSH   • Lipid Panel              This note was electronically signed.    Dave Benavidez M.D.  March 17, 2021

## 2021-03-19 ENCOUNTER — LAB (OUTPATIENT)
Dept: LAB | Facility: HOSPITAL | Age: 51
End: 2021-03-19

## 2021-03-19 LAB
CHOLEST SERPL-MCNC: 241 MG/DL (ref 0–200)
HCV AB SER DONR QL: NORMAL
HDLC SERPL-MCNC: 66 MG/DL (ref 40–60)
LDLC SERPL CALC-MCNC: 159 MG/DL (ref 0–100)
LDLC/HDLC SERPL: 2.38 {RATIO}
TRIGL SERPL-MCNC: 90 MG/DL (ref 0–150)
TSH SERPL DL<=0.05 MIU/L-ACNC: 1.72 UIU/ML (ref 0.27–4.2)
VLDLC SERPL-MCNC: 16 MG/DL (ref 5–40)

## 2021-03-19 PROCEDURE — 80061 LIPID PANEL: CPT | Performed by: OBSTETRICS & GYNECOLOGY

## 2021-03-19 PROCEDURE — 86803 HEPATITIS C AB TEST: CPT | Performed by: OBSTETRICS & GYNECOLOGY

## 2021-03-19 PROCEDURE — 84443 ASSAY THYROID STIM HORMONE: CPT | Performed by: OBSTETRICS & GYNECOLOGY

## 2021-03-19 PROCEDURE — 36415 COLL VENOUS BLD VENIPUNCTURE: CPT | Performed by: OBSTETRICS & GYNECOLOGY

## 2021-03-23 ENCOUNTER — TELEPHONE (OUTPATIENT)
Dept: OBSTETRICS AND GYNECOLOGY | Facility: CLINIC | Age: 51
End: 2021-03-23

## 2021-03-23 DIAGNOSIS — G43.009 MIGRAINE WITHOUT AURA AND WITHOUT STATUS MIGRAINOSUS, NOT INTRACTABLE: Primary | ICD-10-CM

## 2021-03-23 RX ORDER — RIZATRIPTAN BENZOATE 10 MG/1
10 TABLET ORAL ONCE AS NEEDED
Qty: 30 TABLET | Refills: 3 | Status: SHIPPED | OUTPATIENT
Start: 2021-03-23 | End: 2022-03-24 | Stop reason: SDUPTHER

## 2021-03-23 NOTE — TELEPHONE ENCOUNTER
In last week to see dr allen and forgot to ask about a refill for maxalt   [facility]

## 2021-03-23 NOTE — TELEPHONE ENCOUNTER
Patient saw Dr. Benavidez 3/17/21 and at that time she forgot to ask for a new prescription for Maxalt 10 mg for migraines.    The patient calls today requesting a prescription.  Last year Dr. Benavidez prescribed the following:    Maxalt 10 mg  #30  Refill X 3    Please send prescription to [facility].

## 2021-03-24 NOTE — PROGRESS NOTES
Veena, I saw her recently she was overdue for lab work as her last visit was a televisit.  Pap smear was normal.  I will defer lipid management to you.  Thank you

## 2022-01-07 PROCEDURE — 87186 SC STD MICRODIL/AGAR DIL: CPT | Performed by: NURSE PRACTITIONER

## 2022-01-07 PROCEDURE — 87077 CULTURE AEROBIC IDENTIFY: CPT | Performed by: NURSE PRACTITIONER

## 2022-01-07 PROCEDURE — 87086 URINE CULTURE/COLONY COUNT: CPT | Performed by: NURSE PRACTITIONER

## 2022-01-09 ENCOUNTER — TELEPHONE (OUTPATIENT)
Dept: URGENT CARE | Facility: CLINIC | Age: 52
End: 2022-01-09

## 2022-01-09 NOTE — TELEPHONE ENCOUNTER
01/09/2022 brenda talked to patient about positive urine culture results and sent bactrim to pharmacy,

## 2022-03-21 ENCOUNTER — TELEPHONE (OUTPATIENT)
Dept: FAMILY MEDICINE CLINIC | Facility: CLINIC | Age: 52
End: 2022-03-21

## 2022-03-21 RX ORDER — PROGESTERONE 200 MG/1
200 CAPSULE ORAL DAILY
Qty: 30 CAPSULE | Refills: 1 | Status: SHIPPED | OUTPATIENT
Start: 2022-03-21 | End: 2022-03-24 | Stop reason: SDUPTHER

## 2022-03-21 NOTE — TELEPHONE ENCOUNTER
Caller: Dora Gr    Relationship: Self    Best call back number: [phone]     Requested Prescriptions: progesterone (PROMETRIUM) 200 MG capsule    Pharmacy where request should be sent:  [id number] - [place], [place] - [address] AT [facility] [address] - [phone]  - [phone] FX    Additional details provided by patient: PATIENT NEEDS THIS PRESCRIPTION TOMORROW.    Does the patient have less than a 3 day supply:  [x] Yes  [] No    Andre Lopez Rep   03/21/22 15:44 EDT

## 2022-03-24 ENCOUNTER — OFFICE VISIT (OUTPATIENT)
Dept: FAMILY MEDICINE CLINIC | Facility: CLINIC | Age: 52
End: 2022-03-24

## 2022-03-24 VITALS
SYSTOLIC BLOOD PRESSURE: 138 MMHG | HEIGHT: 65 IN | WEIGHT: 134 LBS | TEMPERATURE: 96.9 F | DIASTOLIC BLOOD PRESSURE: 82 MMHG | BODY MASS INDEX: 22.33 KG/M2 | HEART RATE: 80 BPM | RESPIRATION RATE: 16 BRPM | OXYGEN SATURATION: 99 %

## 2022-03-24 DIAGNOSIS — Z00.00 GENERAL MEDICAL EXAM: Primary | ICD-10-CM

## 2022-03-24 DIAGNOSIS — Z12.11 COLON CANCER SCREENING: ICD-10-CM

## 2022-03-24 DIAGNOSIS — E78.2 MIXED HYPERLIPIDEMIA: ICD-10-CM

## 2022-03-24 DIAGNOSIS — N95.9 MENOPAUSAL AND POSTMENOPAUSAL DISORDER: ICD-10-CM

## 2022-03-24 DIAGNOSIS — G43.009 MIGRAINE WITHOUT AURA AND WITHOUT STATUS MIGRAINOSUS, NOT INTRACTABLE: ICD-10-CM

## 2022-03-24 PROCEDURE — 99396 PREV VISIT EST AGE 40-64: CPT | Performed by: PHYSICIAN ASSISTANT

## 2022-03-24 RX ORDER — ESTRADIOL 0.75 MG/1.25G
2.5 GEL, METERED TOPICAL DAILY
Qty: 300 G | Refills: 3 | Status: SHIPPED | OUTPATIENT
Start: 2022-03-24

## 2022-03-24 RX ORDER — TOPIRAMATE 25 MG/1
25 TABLET ORAL 2 TIMES DAILY
Qty: 180 TABLET | Refills: 3 | Status: SHIPPED | OUTPATIENT
Start: 2022-03-24

## 2022-03-24 RX ORDER — RIZATRIPTAN BENZOATE 10 MG/1
10 TABLET ORAL ONCE AS NEEDED
Qty: 30 TABLET | Refills: 3 | Status: SHIPPED | OUTPATIENT
Start: 2022-03-24

## 2022-03-24 RX ORDER — PROGESTERONE 200 MG/1
200 CAPSULE ORAL DAILY
Qty: 90 CAPSULE | Refills: 3 | Status: SHIPPED | OUTPATIENT
Start: 2022-03-24

## 2022-03-24 NOTE — PROGRESS NOTES
Subjective   Dora Gr is a 51 y.o. female  Annual Exam and Migraine (Approx 12 a month)      History of Present Illness     The patient verbally consented to being recorded.     The patient is a 51-year-old female who presents today for an annual exam as well as follow-up on migraines. The patient reports she is getting 12 migraines a month on average. She states she has had a history of migraines since she was a teenager. The patient states she has been taking 2 squirts of Estrogel daily for 1 year. She is also taking progesterone daily. the patient states she has not noticed a set pattern to the migraines. The patient states when she gets a migraine she will take a Maxalt and 2 hours later she is feeling better. The patient states she was keeping track of her migraines. She states 10/27-10/30 she woke up with migraines. The she didn't have another migraine until 11/04. Then 4 days later she had another one, then 6 days, then 3 days, then 3 days in a row.     The patient states she is still experiencing hot flashes at night. She states they have improved since Estrogel and progesterone; however, not completed resolved but tolerable.     The patient reports she experiences sinus drainage. She states she take Flonase and Benadryl.     The patient denies digestive or urinary issues. she reports she see the ophthalmologist yearly.    The patient states she has a mammogram scheduled for 2022. She has not completed a colonoscopy but agrees to do the Cologuard screening. The patient states she walks 3 to 4 miles every day.     The patient has a family history of heart disease and cancer from her paternal side.       Patient presents today for a preventive medical visit.  Patient is here to determine screening labs and tests that are due and to determine immunization status as well.  Patient will be counseled regarding preventative medicine issues such as regular exercise and healthy diet as well.  The following  portions of the patient's history were reviewed and updated as appropriate: allergies, current medications, past social history and problem list    Review of Systems   Constitutional: Negative.  Negative for fatigue and unexpected weight change.   HENT: Positive for congestion. Negative for dental problem, postnasal drip, sinus pressure and sore throat.    Eyes: Negative.  Negative for photophobia, pain and visual disturbance.   Respiratory: Negative.    Cardiovascular: Negative.    Gastrointestinal: Negative.  Negative for nausea and vomiting.   Endocrine: Positive for heat intolerance.   Genitourinary: Negative.    Musculoskeletal: Negative.    Skin: Negative.    Allergic/Immunologic: Negative.    Neurological: Positive for headaches. Negative for dizziness, syncope, facial asymmetry, speech difficulty, weakness, light-headedness and numbness.   Hematological: Negative.    Psychiatric/Behavioral: Negative.  Negative for agitation, confusion, dysphoric mood and sleep disturbance. The patient is not nervous/anxious.    All other systems reviewed and are negative.      Objective     Vitals:    03/24/22 1615   BP: 138/82   Pulse: 80   Resp: 16   Temp: 96.9 °F (36.1 °C)   SpO2: 99%       Physical Exam  Vitals and nursing note reviewed.   Constitutional:       General: She is not in acute distress.     Appearance: Normal appearance. She is well-developed and normal weight. She is not ill-appearing, toxic-appearing or diaphoretic.   HENT:      Head: Normocephalic and atraumatic.      Right Ear: External ear normal.      Left Ear: External ear normal.   Eyes:      Conjunctiva/sclera: Conjunctivae normal.      Pupils: Pupils are equal, round, and reactive to light.   Neck:      Thyroid: No thyromegaly.      Vascular: No carotid bruit or JVD.   Cardiovascular:      Rate and Rhythm: Normal rate and regular rhythm.      Pulses: Normal pulses.      Heart sounds: Normal heart sounds. No murmur heard.  Pulmonary:      Effort:  Pulmonary effort is normal. No respiratory distress.      Breath sounds: Normal breath sounds.   Abdominal:      General: Bowel sounds are normal.      Palpations: Abdomen is soft. There is no mass.      Tenderness: There is no abdominal tenderness.   Musculoskeletal:         General: No swelling. Normal range of motion.      Cervical back: Normal range of motion and neck supple. No rigidity.   Lymphadenopathy:      Cervical: No cervical adenopathy.   Skin:     General: Skin is warm and dry.      Findings: No lesion or rash.   Neurological:      Mental Status: She is alert and oriented to person, place, and time.      Cranial Nerves: No cranial nerve deficit.      Sensory: No sensory deficit.      Motor: No weakness.      Coordination: Coordination normal.      Gait: Gait normal.      Deep Tendon Reflexes: Reflexes are normal and symmetric.   Psychiatric:         Mood and Affect: Mood normal.         Speech: Speech normal.         Behavior: Behavior normal.         Thought Content: Thought content normal.         Judgment: Judgment normal.       Discussed preventative medicine issues with patient including regular exercise, healthy diet, stress reduction, adequate sleep and recommended age-appropriate screening studies.  Assessment/Plan      Migraines  -  I will send in a prescription for Topamax 50 mg ,1 time per day for the first week and then increase to 2 times per day.   - The patient will contact the clinic if symptoms persist.     Health maintenance  -  I will put in an order for a mammogram.  -  I will put in an order for lab work.  - The patient will complete a Cologuard screening.    Transcribed from ambient dictation for Fely Nathan PA-C by JESSE AYALA.  03/24/22   19:51 EDT    Patient verbalized consent to the visit recording.  I have personally performed the services described in this document as transcribed by the above individual, and it is both accurate and complete.  Fely Nathan PA-C   3/25/2022  12:47 EDT       Diagnoses and all orders for this visit:    1. General medical exam (Primary)  -     Lipid Panel; Future  -     Comprehensive metabolic panel; Future  -     CBC (No Diff); Future  -     TSH; Future    2. Migraine without aura and without status migrainosus, not intractable  -     rizatriptan (Maxalt) 10 MG tablet; Take 1 tablet by mouth 1 (One) Time As Needed for Migraine for up to 1 dose. May repeat in 2 hours if needed  Dispense: 30 tablet; Refill: 3  -     Estradiol; Future  -     Progesterone; Future    3. Colon cancer screening  -     Cologuard - Stool, Per Rectum; Future    4. Mixed hyperlipidemia  -     Lipid Panel; Future    5. Menopausal and postmenopausal disorder  -     Estradiol; Future  -     Progesterone; Future    Other orders  -     Progesterone (Prometrium) 200 MG capsule; Take 1 capsule by mouth Daily.  Dispense: 90 capsule; Refill: 3  -     Estradiol (Estrogel) 0.75 MG/1.25 GM (0.06%) topical gel; Place 2.5 g on the skin as directed by provider Daily. Use 2 applications nightly  Dispense: 300 g; Refill: 3  -     topiramate (Topamax) 25 MG tablet; Take 1 tablet by mouth 2 (Two) Times a Day. For migraine prevention  Dispense: 180 tablet; Refill: 3

## 2022-09-09 ENCOUNTER — TELEPHONE (OUTPATIENT)
Dept: FAMILY MEDICINE CLINIC | Facility: CLINIC | Age: 52
End: 2022-09-09

## 2022-09-09 NOTE — TELEPHONE ENCOUNTER
----- Message from Dora Gr sent at 9/9/2022 12:45 PM EDT -----  Regarding: Derm Referral  Fely,    I have a couple places on my skin that have popped up and I would like to see the dermatologist.  The last time I went, I had to have something taken off so I like to stay on top of it.  I see Carina Raza at [facility].  Will you please submit the referral?    Thank you,  Dora Gr

## 2023-04-17 NOTE — TELEPHONE ENCOUNTER
Caller: Dora Gr    Relationship: Self    Best call back number: [phone]    Requested Prescriptions:   Requested Prescriptions     Pending Prescriptions Disp Refills   • Progesterone (Prometrium) 200 MG capsule 90 capsule 3     Sig: Take 1 capsule by mouth Daily.        Pharmacy where request should be sent: [id number] - [place], [place] - [address] AT [facility] [address] - [phone]  - [phone] FX     Last office visit with prescribing clinician: 3/24/2022   Last telemedicine visit with prescribing clinician: 6/15/2023   Next office visit with prescribing clinician: 6/15/2023     Additional details provided by patient: PATIENT HAS LESS THAN 3 DAYS    Does the patient have less than a 3 day supply:  [x] Yes  [] No    Would you like a call back once the refill request has been completed: [] Yes [] No    If the office needs to give you a call back, can they leave a voicemail: [] Yes [] No    Andre Pereira Rep   04/17/23 10:27 EDT

## 2023-04-18 RX ORDER — PROGESTERONE 200 MG/1
200 CAPSULE ORAL DAILY
Qty: 90 CAPSULE | Refills: 3 | Status: SHIPPED | OUTPATIENT
Start: 2023-04-18

## 2023-06-15 ENCOUNTER — OFFICE VISIT (OUTPATIENT)
Dept: FAMILY MEDICINE CLINIC | Facility: CLINIC | Age: 53
End: 2023-06-15
Payer: OTHER GOVERNMENT

## 2023-06-15 VITALS
OXYGEN SATURATION: 99 % | DIASTOLIC BLOOD PRESSURE: 70 MMHG | BODY MASS INDEX: 22.56 KG/M2 | SYSTOLIC BLOOD PRESSURE: 128 MMHG | TEMPERATURE: 97.8 F | WEIGHT: 135.4 LBS | HEART RATE: 64 BPM | HEIGHT: 65 IN

## 2023-06-15 DIAGNOSIS — E78.2 MIXED HYPERLIPIDEMIA: ICD-10-CM

## 2023-06-15 DIAGNOSIS — G43.009 MIGRAINE WITHOUT AURA AND WITHOUT STATUS MIGRAINOSUS, NOT INTRACTABLE: ICD-10-CM

## 2023-06-15 DIAGNOSIS — Z00.00 GENERAL MEDICAL EXAM: Primary | ICD-10-CM

## 2023-06-15 DIAGNOSIS — L98.9 SKIN LESION: ICD-10-CM

## 2023-06-15 DIAGNOSIS — N95.1 MENOPAUSAL AND FEMALE CLIMACTERIC STATES: ICD-10-CM

## 2023-06-15 PROCEDURE — 93000 ELECTROCARDIOGRAM COMPLETE: CPT | Performed by: PHYSICIAN ASSISTANT

## 2023-06-15 PROCEDURE — 99396 PREV VISIT EST AGE 40-64: CPT | Performed by: PHYSICIAN ASSISTANT

## 2023-06-15 RX ORDER — RIMEGEPANT SULFATE 75 MG/75MG
75 TABLET, ORALLY DISINTEGRATING ORAL DAILY PRN
Qty: 24 TABLET | Refills: 3 | Status: SHIPPED | OUTPATIENT
Start: 2023-06-15

## 2023-06-15 RX ORDER — ESTRADIOL 0.75 MG/1.25G
2.5 GEL, METERED TOPICAL DAILY
Qty: 300 G | Refills: 3 | Status: SHIPPED | OUTPATIENT
Start: 2023-06-15

## 2023-06-15 RX ORDER — PROGESTERONE 200 MG/1
200 CAPSULE ORAL DAILY
Qty: 90 CAPSULE | Refills: 3 | Status: SHIPPED | OUTPATIENT
Start: 2023-06-15

## 2023-06-15 NOTE — PROGRESS NOTES
Subjective   Dora Gr is a 52 y.o. female  Annual Exam (Annual Physical), HRT therapy (Refill on progesterone,estradiol), and Migraine (Refill on maxalt)      History of Present Illness  Patient presents today for a preventive medical visit.  Patient is here to determine screening labs and tests that are due and to determine immunization status as well.  Patient will be counseled regarding preventative medicine issues such as regular exercise and healthy diet as well.  The following portions of the patient's history were reviewed and updated as appropriate: allergies, current medications, past social history and problem list    Review of Systems    Objective     Vitals:    06/15/23 0934   BP: 128/70   Pulse: 64   Temp: 97.8 °F (36.6 °C)   SpO2: 99%       Physical Exam  Discussed preventative medicine issues with patient including regular exercise, healthy diet, stress reduction, adequate sleep and recommended age-appropriate screening studies.  Assessment & Plan     There are no diagnoses linked to this encounter.

## 2023-06-15 NOTE — PROGRESS NOTES
"Subjective   Dora Gr is a 52 y.o. female  Annual Exam (Annual Physical), HRT therapy (Refill on progesterone,estradiol), and Migraine (Refill on maxalt)      History of Present Illness     The patient is a 52-year-old female seen today for migraines.    The patient tried medication for approximately 3 months, but she did not notice significant improvement. Her migraines have been sporadic, but in early 05/2023, she had migraines for 8 days consecutively. She has only had 1 or 2 since then. She did take the rizatriptan, which dulled it temporarily, but after 24 hours it would recur. She has felt \"dull\" as a side effect of the medication. She had a migraine last night, 06/14/2023. She does have dental issues and has a bite guard.     The patient has not scheduled her mammogram. Her colon test was normal. She had coffee with cream and sugar this morning. She walks frequently for exercise and follows a healthy diet. The patient does not eat a lot of red meat, processed food, or fast food. She is not a smoker or a diabetic. She is not overweight and her blood pressure is fine. She denies any digestive issues, enlarged lymph nodes, or swallowing issues.    She has 2 skin lesions that she believes are precancerous and requests a dermatology referral. The patient has had precancerous lesions previously that were either removed or burned.     The patient a tetanus vaccine not too long ago.     + Patient presents today for a preventive medical visit.  Patient is here to determine screening labs and tests that are due and to determine immunization status as well.  Patient will be counseled regarding preventative medicine issues such as regular exercise and healthy diet as well.  The following portions of the patient's history were reviewed and updated as appropriate: allergies, current medications, past social history and problem list    Review of Systems   Constitutional: Negative.  Negative for fatigue and unexpected " weight change.   HENT: Negative for congestion, dental problem, postnasal drip, sinus pressure and sore throat.    Eyes: Negative.  Negative for photophobia, pain and visual disturbance.   Respiratory: Negative.    Cardiovascular: Negative.    Gastrointestinal: Negative.  Negative for nausea and vomiting.   Endocrine: Positive for heat intolerance.   Genitourinary: Negative.    Musculoskeletal: Negative.    Skin: Positive for color change ( skin lesions changing).   Allergic/Immunologic: Negative.    Neurological: Positive for headaches. Negative for dizziness, syncope, facial asymmetry, speech difficulty, weakness, light-headedness and numbness.   Hematological: Negative.    Psychiatric/Behavioral: Positive for sleep disturbance. Negative for agitation, confusion and dysphoric mood. The patient is not nervous/anxious.    All other systems reviewed and are negative.      Objective     Vitals:    06/15/23 0934   BP: 128/70   Pulse: 64   Temp: 97.8 °F (36.6 °C)   SpO2: 99%       Physical Exam  Vitals and nursing note reviewed.   Constitutional:       General: She is not in acute distress.     Appearance: Normal appearance. She is well-developed. She is not ill-appearing, toxic-appearing or diaphoretic.   HENT:      Head: Normocephalic and atraumatic.      Right Ear: External ear normal.      Left Ear: External ear normal.   Eyes:      Conjunctiva/sclera: Conjunctivae normal.      Pupils: Pupils are equal, round, and reactive to light.   Neck:      Thyroid: No thyromegaly.      Vascular: No carotid bruit or JVD.   Cardiovascular:      Rate and Rhythm: Normal rate and regular rhythm.      Pulses: Normal pulses.      Heart sounds: Normal heart sounds. No murmur heard.  Pulmonary:      Effort: Pulmonary effort is normal. No respiratory distress.      Breath sounds: Normal breath sounds.   Abdominal:      General: Bowel sounds are normal.      Palpations: Abdomen is soft. There is no mass.      Tenderness: There is no  abdominal tenderness.   Musculoskeletal:         General: No swelling. Normal range of motion.      Cervical back: Normal range of motion and neck supple.   Lymphadenopathy:      Cervical: No cervical adenopathy.   Skin:     General: Skin is warm and dry.      Findings: No lesion or rash.   Neurological:      Mental Status: She is alert and oriented to person, place, and time.      Cranial Nerves: No cranial nerve deficit.      Sensory: No sensory deficit.      Motor: No weakness.      Coordination: Coordination normal.      Gait: Gait normal.      Deep Tendon Reflexes: Reflexes are normal and symmetric.   Psychiatric:         Mood and Affect: Mood normal.         Behavior: Behavior normal.         Thought Content: Thought content normal.         Judgment: Judgment normal.       ECG 12 Lead    Date/Time: 6/15/2023 10:13 AM  Performed by: Fely Nathan PA-C  Authorized by: Fely Nathan PA-C   Comparison: not compared with previous ECG   Rhythm: sinus bradycardia  Rate: bradycardic  BPM: 59  Conduction: conduction normal  ST Segments: ST segments normal  T Waves: T waves normal  QRS axis: normal  Other: no other findings    Clinical impression: normal ECG            Discussed preventative medicine issues with patient including regular exercise, healthy diet, stress reduction, adequate sleep and recommended age-appropriate screening studies.  Assessment & Plan     Diagnoses and all orders for this visit:    1. General medical exam (Primary)  -     Lipid Panel; Future  -     Comprehensive metabolic panel; Future  -     CBC (No Diff); Future  -     TSH; Future  -     Vitamin D,25-Hydroxy; Future    2. Migraine without aura and without status migrainosus, not intractable    3. Menopausal and female climacteric states  -     Cancel: Ambulatory Referral to Dermatology    4. Skin lesion  -     Ambulatory Referral to Dermatology    Other orders  -     Rimegepant Sulfate (Nurtec) 75 MG tablet dispersible tablet;  Take 1 tablet by mouth Daily As Needed (migraines).  Dispense: 24 tablet; Refill: 3  -     Progesterone (Prometrium) 200 MG capsule; Take 1 capsule by mouth Daily.  Dispense: 90 capsule; Refill: 3  -     Estradiol (Estrogel) 0.75 MG/1.25 GM (0.06%) topical gel; Place 2.5 g on the skin as directed by provider Daily. Use 2 applications nightly  Dispense: 300 g; Refill: 3  -     ECG 12 Lead      Nurtec and Ubrelvy will be prescribed. She was advised to keep following a diet that is a rich in plant-based foods, fruits, and vegetables and to keep saturated fats low. She will be referred to a dermatologist.    Transcribed from ambient dictation for Fely Nathan PA-C by Soheila Cohen.  06/15/23   11:21 EDT    Patient or patient representative verbalized consent to the visit recording.  I have personally performed the services described in this document as transcribed by the above individual, and it is both accurate and complete.  Fely Nathan PA-C  6/15/2023  12:35 EDT

## 2023-08-23 DIAGNOSIS — G43.009 MIGRAINE WITHOUT AURA AND WITHOUT STATUS MIGRAINOSUS, NOT INTRACTABLE: ICD-10-CM

## 2023-08-23 RX ORDER — RIZATRIPTAN BENZOATE 10 MG/1
TABLET ORAL
Qty: 36 TABLET | Refills: 3 | Status: SHIPPED | OUTPATIENT
Start: 2023-08-23

## 2023-09-05 ENCOUNTER — TELEPHONE (OUTPATIENT)
Dept: FAMILY MEDICINE CLINIC | Facility: CLINIC | Age: 53
End: 2023-09-05
Payer: OTHER GOVERNMENT

## 2023-09-05 NOTE — TELEPHONE ENCOUNTER
It was sent to [facility]. I changed it to [facility] and faxed the order/referrral to them just now and I will let patient k now...  Thanks,  Loulou.....

## 2023-09-05 NOTE — TELEPHONE ENCOUNTER
Loulou can you please see if you can put this through for patient, I am not sure where we sent it previously

## 2023-09-05 NOTE — TELEPHONE ENCOUNTER
----- Message from Dora Gr sent at 8/30/2023 11:33 AM EDT -----  Regarding: Derm Referral  Contact: [phone]  Fely,    I just realized the dermatology referral is not to the place I go.  I see Carina Raza at [facility], [address] [place] [address]   May I get that changed?  She is a close friend of mine besides doing a great job. I apologize if I told you the wrong place.    Thank you,  Dora Gr

## 2023-10-23 RX ORDER — ESTRADIOL 0.75 MG/1.25G
2.5 GEL, METERED TOPICAL DAILY
Qty: 300 G | Refills: 3 | Status: SHIPPED | OUTPATIENT
Start: 2023-10-23

## 2023-10-23 NOTE — TELEPHONE ENCOUNTER
----- Message from Dora Gr sent at 10/23/2023  8:56 AM EDT -----  Regarding: Estrogel  Contact: [phone]  Fely,    Will you please send the Estrogel prescription to [facility] again?  When I had my appointment in the summer and you sent it, they couldn't fill it because they didn't have it.  Now, however, they won't fill it locally without charging me a large fee.      Thank you,  Dora

## 2024-06-26 RX ORDER — PROGESTERONE 200 MG/1
200 CAPSULE ORAL DAILY
Qty: 90 CAPSULE | Refills: 0 | Status: SHIPPED | OUTPATIENT
Start: 2024-06-26 | End: 2024-06-27 | Stop reason: SDUPTHER

## 2024-06-26 NOTE — TELEPHONE ENCOUNTER
----- Message from [facility] sent at 6/25/2024 11:28 AM EDT -----  Regarding: Progesterone refill  Contact: [phone]  I am about out of my progesterone and am leaving to travel at the end of this week for a month.  I have plenty of all my other medications.  Will you please refill the progesterone at the [facility]?

## 2024-06-27 RX ORDER — PROGESTERONE 200 MG/1
200 CAPSULE ORAL DAILY
Qty: 90 CAPSULE | Refills: 0 | Status: SHIPPED | OUTPATIENT
Start: 2024-06-27

## 2024-06-27 NOTE — TELEPHONE ENCOUNTER
Caller: Dora Gr    Relationship: Self    Best call back number: [phone]     Requested Prescriptions:   Requested Prescriptions     Pending Prescriptions Disp Refills    Progesterone (Prometrium) 200 MG capsule 90 capsule 0     Sig: Take 1 capsule by mouth Daily.        Pharmacy where request should be sent: [id number] - [place], [place] - [address] AT [facility] [address] - [phone]  - [phone] FX     Last office visit with prescribing clinician: 6/15/2023   Last telemedicine visit with prescribing clinician: Visit date not found   Next office visit with prescribing clinician: Visit date not found     Additional details provided by patient: PLEASE CANCEL THIS AT [facility] AND SEND TO PHARMACY LISTED ABOVE.    Does the patient have less than a 3 day supply:  [x] Yes  [] No    Would you like a call back once the refill request has been completed: [] Yes [x] No    If the office needs to give you a call back, can they leave a voicemail: [] Yes [x] No    Andre Yang Rep   06/27/24 10:44 EDT

## 2024-10-28 ENCOUNTER — OFFICE VISIT (OUTPATIENT)
Dept: FAMILY MEDICINE CLINIC | Facility: CLINIC | Age: 54
End: 2024-10-28
Payer: OTHER GOVERNMENT

## 2024-10-28 VITALS
TEMPERATURE: 97.7 F | HEART RATE: 77 BPM | WEIGHT: 136.4 LBS | BODY MASS INDEX: 22.7 KG/M2 | SYSTOLIC BLOOD PRESSURE: 126 MMHG | OXYGEN SATURATION: 99 % | DIASTOLIC BLOOD PRESSURE: 62 MMHG

## 2024-10-28 DIAGNOSIS — G43.709 CHRONIC MIGRAINE WITHOUT AURA WITHOUT STATUS MIGRAINOSUS, NOT INTRACTABLE: ICD-10-CM

## 2024-10-28 DIAGNOSIS — R51.9 WORSENING HEADACHES: Primary | ICD-10-CM

## 2024-10-28 DIAGNOSIS — N95.1 MENOPAUSAL SYMPTOMS: ICD-10-CM

## 2024-10-28 DIAGNOSIS — G43.009 MIGRAINE WITHOUT AURA AND WITHOUT STATUS MIGRAINOSUS, NOT INTRACTABLE: ICD-10-CM

## 2024-10-28 PROCEDURE — 99214 OFFICE O/P EST MOD 30 MIN: CPT | Performed by: PHYSICIAN ASSISTANT

## 2024-10-28 RX ORDER — ONDANSETRON 8 MG/1
8 TABLET, ORALLY DISINTEGRATING ORAL EVERY 8 HOURS PRN
Qty: 6 TABLET | Refills: 2 | Status: SHIPPED | OUTPATIENT
Start: 2024-10-28

## 2024-10-28 RX ORDER — ESTRADIOL 0.75 MG/1.25G
2.5 GEL TOPICAL DAILY
Qty: 300 G | Refills: 3 | Status: SHIPPED | OUTPATIENT
Start: 2024-10-28

## 2024-10-28 RX ORDER — PROGESTERONE 200 MG/1
200 CAPSULE ORAL DAILY
Qty: 90 CAPSULE | Refills: 3 | Status: SHIPPED | OUTPATIENT
Start: 2024-10-28

## 2024-10-28 RX ORDER — RIMEGEPANT SULFATE 75 MG/75MG
75 TABLET, ORALLY DISINTEGRATING ORAL DAILY PRN
Qty: 24 TABLET | Refills: 3 | Status: SHIPPED | OUTPATIENT
Start: 2024-10-28

## 2024-10-28 NOTE — PROGRESS NOTES
Subjective   Dora Gr is a 54 y.o. female  Migraine (Increased migraines in the past few months, maxalt is not helping as much, possible referral to neurologist) and Menopause (Refill on estradiol and progesterone )      History of Present Illness  History of Present Illness  The patient is a 54-year-old female who is coming in today for follow-up on menopausal symptoms and migraines.    She reports an increase in the frequency of her migraines, experiencing more than one per week. Last week, she had to take rizatriptan for five consecutive days. The medication provides temporary relief, but the migraines return within 24 hours. This pattern has been consistent throughout the summer. She recalls a particularly severe migraine episode about a month ago, which was accompanied by intense vomiting. She does not typically experience nausea with her migraines. Over the past year, her migraines have shifted from the right side of her head to the left, and she now experiences pain under her eye. Her last migraine occurred last Thursday. She has tried Ubrelvy, but it did not provide any relief. She attempted to use topiramate as a preventative measure two years ago, but it did not yield any noticeable improvement. She has not tried any other medications for her migraines.    Her vision remains unaffected when she is not experiencing a migraine. She has not experienced any double vision or loss of peripheral vision. She has not had any issues with reading. Her annual eye exams have consistently shown no abnormalities. She has never had a head scan.    She continues to experience hot flashes although they are much improved on ERT. Her sleep is generally good.    She reports no history of kidney stones. She has not experienced any recent head injuries or car accidents.    FAMILY HISTORY  Her siblings have kidney stones. Her mother and sister have migraines.    ALLERGIES  She is not allergic to contrast.    The following  portions of the patient's history were reviewed and updated as appropriate: allergies, current medications, past social history and problem list    Review of Systems   Constitutional:  Negative for fatigue and unexpected weight change.   HENT:  Negative for congestion, dental problem, postnasal drip, sinus pressure and sore throat.    Eyes:  Positive for visual disturbance. Negative for photophobia and pain.   Gastrointestinal:  Positive for nausea and vomiting.   Neurological:  Positive for headaches. Negative for dizziness, syncope, facial asymmetry, speech difficulty, weakness, light-headedness and numbness.   Psychiatric/Behavioral:  Negative for agitation, confusion, dysphoric mood and sleep disturbance. The patient is not nervous/anxious.        Objective     Vitals:    10/28/24 1358   BP: 126/62   Pulse: 77   Temp: 97.7 °F (36.5 °C)   SpO2: 99%       Physical Exam  Vitals and nursing note reviewed.   Constitutional:       General: She is not in acute distress.     Appearance: Normal appearance. She is well-developed. She is not ill-appearing, toxic-appearing or diaphoretic.   HENT:      Head: Normocephalic and atraumatic.   Eyes:      Extraocular Movements: Extraocular movements intact.      Conjunctiva/sclera: Conjunctivae normal.      Pupils: Pupils are equal, round, and reactive to light.      Comments: Wears eyeglasses, eye exam with opth utd this summer   Cardiovascular:      Rate and Rhythm: Normal rate and regular rhythm.   Pulmonary:      Effort: Pulmonary effort is normal.   Musculoskeletal:      Cervical back: Normal range of motion and neck supple. No rigidity.   Neurological:      Mental Status: She is alert and oriented to person, place, and time.      Cranial Nerves: No cranial nerve deficit.      Sensory: No sensory deficit.      Motor: No weakness.      Coordination: Coordination normal.   Psychiatric:         Mood and Affect: Mood normal.         Speech: Speech normal.         Behavior:  Behavior normal.         Thought Content: Thought content normal.         Judgment: Judgment normal.       Physical Exam      Assessment & Plan   Assessment & Plan  1. Migraines.  She reports worsening migraines, occurring more frequently and with increased severity, including an episode a month ago with excruciating pain and vomiting. She has been using rizatriptan frequently but experiences recurrent migraines within 24 hours. Previous trials of Topamax (topiramate) in 2022 were ineffective. She was provided with samples of Nurtec to try and was instructed to report its effectiveness. If Nurtec is ineffective, the next step will be to pursue a preventative shot, likely Aimovig or a similar medication, through Neurology. An MRI of the brain with contrast was ordered due to changes in migraine pattern and severity. Ondansetron (Zofran) was prescribed for nausea. A referral to Neurology was made to expedite further management.    2. Menopausal symptoms.  She continues to experience hot flashes, though they are improved with hormone therapy. A refill for her hormone medication was provided through [facility].      Diagnoses and all orders for this visit:    1. Worsening headaches (Primary)  -     Ambulatory Referral to Neurology  -     Cancel: MRI Brain With & Without Contrast; Future  -     MRI Brain With & Without Contrast; Future    2. Migraine without aura and without status migrainosus, not intractable  -     Ambulatory Referral to Neurology  -     Cancel: MRI Brain With & Without Contrast; Future  -     MRI Brain With & Without Contrast; Future    3. Chronic migraine without aura without status migrainosus, not intractable  -     Ambulatory Referral to Neurology  -     Cancel: MRI Brain With & Without Contrast; Future  -     MRI Brain With & Without Contrast; Future    Other orders  -     ondansetron ODT (ZOFRAN-ODT) 8 MG disintegrating tablet; Place 1 tablet on the tongue Every 8 (Eight) Hours As Needed  for Nausea or Vomiting.  Dispense: 6 tablet; Refill: 2  -     Progesterone (Prometrium) 200 MG capsule; Take 1 capsule by mouth Daily.  Dispense: 90 capsule; Refill: 3  -     Estradiol (Estrogel) 0.75 MG/1.25 GM (0.06%) topical gel; Place 2.5 g on the skin as directed by provider Daily. Use 2 applications nightly  Dispense: 300 g; Refill: 3  -     Rimegepant Sulfate (Nurtec) 75 MG tablet dispersible tablet; Take 1 tablet by mouth Daily As Needed (migraines).  Dispense: 24 tablet; Refill: 3         Patient or patient representative verbalized consent for the use of Ambient Listening during the visit with  Fely Nathan PA-C for chart documentation. 10/28/2024  14:29 EDT  Answers submitted by the patient for this visit:  Other (Submitted on 10/27/2024)  Please describe your symptoms.: I am having too many migraines and they are becoming debilitating.  Have you had these symptoms before?: Yes  How long have you been having these symptoms?: Greater than 2 weeks  Please list any medications you are currently taking for this condition.: Maxalt  Please describe any probable cause for these symptoms. : unknown  Primary Reason for Visit (Submitted on 10/27/2024)  What is the primary reason for your visit?: Problem Not Listed

## 2024-11-05 ENCOUNTER — HOSPITAL ENCOUNTER (OUTPATIENT)
Facility: HOSPITAL | Age: 54
Discharge: HOME | End: 2024-11-05
Admitting: PHYSICIAN ASSISTANT
Payer: OTHER GOVERNMENT

## 2024-11-05 DIAGNOSIS — G43.009 MIGRAINE WITHOUT AURA AND WITHOUT STATUS MIGRAINOSUS, NOT INTRACTABLE: ICD-10-CM

## 2024-11-05 DIAGNOSIS — R51.9 WORSENING HEADACHES: ICD-10-CM

## 2024-11-05 DIAGNOSIS — G43.709 CHRONIC MIGRAINE WITHOUT AURA WITHOUT STATUS MIGRAINOSUS, NOT INTRACTABLE: ICD-10-CM

## 2024-11-05 PROCEDURE — 70553 MRI BRAIN STEM W/O & W/DYE: CPT

## 2024-11-05 PROCEDURE — A9577 INJ MULTIHANCE: HCPCS | Performed by: PHYSICIAN ASSISTANT

## 2024-11-05 PROCEDURE — 0 GADOBENATE DIMEGLUMINE 529 MG/ML SOLUTION: Performed by: PHYSICIAN ASSISTANT

## 2024-11-05 RX ADMIN — GADOBENATE DIMEGLUMINE 12 ML: 529 INJECTION, SOLUTION INTRAVENOUS at 18:57

## 2024-11-06 DIAGNOSIS — E23.6 CYST OF PITUITARY GLAND: Primary | ICD-10-CM

## 2024-11-11 ENCOUNTER — OFFICE VISIT (OUTPATIENT)
Dept: NEUROSURGERY | Facility: CLINIC | Age: 54
End: 2024-11-11
Payer: OTHER GOVERNMENT

## 2024-11-11 VITALS — BODY MASS INDEX: 22.76 KG/M2 | HEIGHT: 65 IN | WEIGHT: 136.6 LBS | TEMPERATURE: 97.7 F

## 2024-11-11 DIAGNOSIS — E23.6 PITUITARY CYST: Primary | ICD-10-CM

## 2024-11-11 PROCEDURE — 99203 OFFICE O/P NEW LOW 30 MIN: CPT | Performed by: NEUROLOGICAL SURGERY

## 2024-11-11 NOTE — PROGRESS NOTES
"Subjective     Chief Complaint: Headache, abnormal brain MRI    Patient ID: Dora Gr is a 54 y.o. female seen for consultation today at the request of  Fely Nathan PA-C    History of Present Illness    This is a 54-year-old woman who underwent a MRI of the brain during workup for worsening migraines.  A cyst located within the pituitary gland was uncovered, and referral to my clinic was accordingly established.    She has a longstanding history of migraines swelling all the way back to her teenage years.  She states that she underwent menopause about 7-8 years ago and since that time she has noticed a change and worsening in terms of her headaches.    She denies any changes in her ring size, heart size, shoe size, or lactation.  She does have hot flashes which have been difficult to control with hormone replacement therapy.  She denies alcohol, tobacco, or drug use.    The following portions of the patient's history were reviewed and updated as appropriate: allergies, current medications, past family history, past medical history, past social history, past surgical history and problem list.    Family history:   Family History   Problem Relation Age of Onset    Arthritis Mother     Vision loss Mother     Heart disease Father     Lymphoma Father         non-Hodgkins    Cancer Father     No Known Problems Sister     No Known Problems Brother     No Known Problems Sister        Social history:   Social History     Socioeconomic History    Marital status:    Tobacco Use    Smoking status: Never    Smokeless tobacco: Never   Vaping Use    Vaping status: Never Used   Substance and Sexual Activity    Alcohol use: Yes     Alcohol/week: 2.0 standard drinks of alcohol     Types: 2 Glasses of wine per week    Drug use: No    Sexual activity: Yes     Partners: Male       Review of Systems    Objective   Temperature 97.7 °F (36.5 °C), temperature source Temporal, height 165.1 cm (65\"), weight 62 kg (136 lb 9.6 " oz), not currently breastfeeding.  Body mass index is 22.73 kg/m².    Physical Exam  Vitals and nursing note reviewed.   Constitutional:       General: She is not in acute distress.     Appearance: Normal appearance. She is well-developed. She is not toxic-appearing or diaphoretic.   HENT:      Head: Normocephalic and atraumatic.   Eyes:      General:         Right eye: No discharge.         Left eye: No discharge.      Pupils: Pupils are equal, round, and reactive to light.   Neck:      Thyroid: No thyromegaly.      Vascular: No JVD.      Trachea: Phonation normal. No tracheal deviation.   Cardiovascular:      Rate and Rhythm: Normal rate and regular rhythm.   Pulmonary:      Effort: Pulmonary effort is normal. No respiratory distress.      Breath sounds: No stridor. No wheezing.   Abdominal:      General: There is no distension.      Palpations: Abdomen is soft.      Tenderness: There is no abdominal tenderness.   Musculoskeletal:      Comments: Muscle Group     L          R  Deltoid                5          5  Bicep                  5          5  Tricep                 5          5                       5          5  Hand IO              5          5  Hip Flexor           5          5  Knee Extensor   5          5     ADF                    5          5  APF                    5          5      Skin:     General: Skin is warm and dry.      Findings: No erythema.   Neurological:      Mental Status: She is alert and oriented to person, place, and time.      GCS: GCS eye subscore is 4. GCS verbal subscore is 5. GCS motor subscore is 6.      Cranial Nerves: No cranial nerve deficit.      Sensory: No sensory deficit.      Motor: No abnormal muscle tone.      Coordination: Coordination normal.      Gait: Gait normal.      Deep Tendon Reflexes: Reflexes are normal and symmetric. Reflexes normal.      Comments: CN II-XII grossly intact.    No pronator drift. FTN testing performed without dysmetria or bradykinesia.   Visual fields are full to finger counting.    Casual, toe raised, heel raised, and tandem gait testing are performed unremarkably.  Speech production is fluent with no paraphasic errors.  She is alert, pleasant, conversant, and appropriate.  She is a good historian.   Psychiatric:         Speech: Speech normal.         Behavior: Behavior normal.         Thought Content: Thought content normal.         Judgment: Judgment normal.         Assessment & Plan     Independent Review of Radiographic Studies:      Available for my review is a MRI of the brain with and without contrast that was performed 11/5/2024.  There is a CSF isointense lesion measuring 0.7 x 1.0 x 0.8 cm situated within the leftward aspect of the pituitary gland.  There is no radiographic evidence of chiasmatic compression.    Medical Decision Making:      This is a 54-year-old woman with what appears to be an incidentally discovered pituitary cyst.  The differential diagnosis is pituitary adenoma, Rathke's cleft cyst, or arachnoid cyst.  I do not think this is an adenoma.  I have ordered a pituitary panel and I will repeat the MRI of the brain with and without contrast in 6 months, or sooner if clinically indicated.  I think the risk of morbidity at this point without intervention is low.  I do not think that this lesion is contributing in a meaningful way to her symptoms.    Diagnoses and all orders for this visit:    1. Pituitary cyst (Primary)  -     MRI Brain With & Without Contrast; Future  -     FSH & LH; Future  -     Prolactin; Future  -     T4, Free; Future  -     TSH; Future  -     ACTH; Future  -     Insulin-like Growth Factor; Future        No follow-ups on file.           This document signed by MARCUS Glez MD November 11, 2024 10:39 EST

## 2024-11-13 ENCOUNTER — TELEPHONE (OUTPATIENT)
Dept: NEUROLOGY | Facility: CLINIC | Age: 54
End: 2024-11-13
Payer: OTHER GOVERNMENT

## 2024-11-13 NOTE — TELEPHONE ENCOUNTER
Caller: NYLA       Best call back number: [phone] CAN LEAVE DETAILED MESS ON V.M EVEN DATE AND TIME OF NEW APPT       What was the call regarding: PT WAS RESCHED DUE TO PROVIDER BEING OUT FOR  CAN U GET HER IN SOONER THAT 2025    Is it okay if the provider responds through [employer]: CALL TO RESCHED.     PLEASE ADVISE.

## 2024-11-14 ENCOUNTER — TELEPHONE (OUTPATIENT)
Dept: FAMILY MEDICINE CLINIC | Facility: CLINIC | Age: 54
End: 2024-11-14
Payer: OTHER GOVERNMENT

## 2024-11-14 ENCOUNTER — PATIENT ROUNDING (BHMG ONLY) (OUTPATIENT)
Dept: NEUROSURGERY | Facility: CLINIC | Age: 54
End: 2024-11-14
Payer: OTHER GOVERNMENT

## 2024-11-14 NOTE — TELEPHONE ENCOUNTER
Called patient, and scheduled for 12/26/24, and left her February appt for a follow up in case it was needed.

## 2024-11-14 NOTE — TELEPHONE ENCOUNTER
So, the Nurtec has been working with minimal side effects.  [facility] has not sent me the prescription you ordered, but maybe now that I have tried it, they will.  Will you please try to see what they need?     I was supposed to see the neurologist this morning, but they had to cancel my appointment due to a death in the family and now my appointment is late March. Therefore, now the Nurtec is even more important to me. I have taken the 4 samples you gave me and will now be back to Maxalt.     Thank you,  Dora Gr

## 2024-11-14 NOTE — TELEPHONE ENCOUNTER
Insurance required patient to try/fail Aimovig, Ajovy and Emgality before they will pay for nurtec

## 2024-11-14 NOTE — PROGRESS NOTES
A MY-CHART MESSAGE HAS BEEN SENT TO THE PATIENT FOR PATIENT ROUNDING WITH [facility].

## 2024-11-15 ENCOUNTER — TELEPHONE (OUTPATIENT)
Dept: FAMILY MEDICINE CLINIC | Facility: CLINIC | Age: 54
End: 2024-11-15

## 2024-11-15 ENCOUNTER — LAB (OUTPATIENT)
Dept: LAB | Facility: HOSPITAL | Age: 54
End: 2024-11-15
Payer: OTHER GOVERNMENT

## 2024-11-15 DIAGNOSIS — E23.6 PITUITARY CYST: ICD-10-CM

## 2024-11-15 LAB
FSH SERPL-ACNC: 48.5 MIU/ML
LH SERPL-ACNC: 27.9 MIU/ML
PROLACTIN SERPL-MCNC: 15.8 NG/ML (ref 4.79–23.3)
T4 FREE SERPL-MCNC: 1.14 NG/DL (ref 0.92–1.68)
TSH SERPL DL<=0.05 MIU/L-ACNC: 1.75 UIU/ML (ref 0.27–4.2)

## 2024-11-15 PROCEDURE — 82024 ASSAY OF ACTH: CPT

## 2024-11-15 PROCEDURE — 83002 ASSAY OF GONADOTROPIN (LH): CPT

## 2024-11-15 PROCEDURE — 83001 ASSAY OF GONADOTROPIN (FSH): CPT

## 2024-11-15 PROCEDURE — 84439 ASSAY OF FREE THYROXINE: CPT

## 2024-11-15 PROCEDURE — 84443 ASSAY THYROID STIM HORMONE: CPT

## 2024-11-15 PROCEDURE — 36415 COLL VENOUS BLD VENIPUNCTURE: CPT

## 2024-11-15 PROCEDURE — 84305 ASSAY OF SOMATOMEDIN: CPT

## 2024-11-15 PROCEDURE — 84146 ASSAY OF PROLACTIN: CPT

## 2024-11-15 NOTE — TELEPHONE ENCOUNTER
Sending referral back to [facility] to see if they can get her in sooner. Will call Dr. Conway's office to see how far out they are.

## 2024-11-15 NOTE — TELEPHONE ENCOUNTER
I have gotten the [facility] approved I will contact the pharm and patient . FWD to [facility] to check on referral

## 2024-11-15 NOTE — TELEPHONE ENCOUNTER
"Neurology replied in referral:  \"PATIENT IS SCHEDULED FOR 12/26/24 @ 9 AM AND ADDED TO WAIT LIST FOR PROVIDER\"         "

## 2024-11-15 NOTE — TELEPHONE ENCOUNTER
Dr. Conway's office does not take  unless it is a medicare supplement. Will see what [facility] can do.

## 2024-11-17 LAB
ACTH PLAS-MCNC: 20.9 PG/ML (ref 7.2–63.3)
IGF-I SERPL-MCNC: 130 NG/ML (ref 65–216)

## 2024-11-18 NOTE — TELEPHONE ENCOUNTER
I believe [facility] takes [facility], but unsure how far out they are.  also takes [facility], but they are for sure booking out much further into the new year. I will send to [facility] and see when they can get her scheduled.

## 2024-12-26 ENCOUNTER — OFFICE VISIT (OUTPATIENT)
Dept: NEUROLOGY | Facility: CLINIC | Age: 54
End: 2024-12-26
Payer: OTHER GOVERNMENT

## 2024-12-26 ENCOUNTER — SPECIALTY PHARMACY (OUTPATIENT)
Dept: GENERAL RADIOLOGY | Facility: HOSPITAL | Age: 54
End: 2024-12-26
Payer: OTHER GOVERNMENT

## 2024-12-26 VITALS
DIASTOLIC BLOOD PRESSURE: 70 MMHG | SYSTOLIC BLOOD PRESSURE: 116 MMHG | HEIGHT: 65 IN | BODY MASS INDEX: 22.64 KG/M2 | WEIGHT: 135.9 LBS | OXYGEN SATURATION: 98 % | HEART RATE: 78 BPM

## 2024-12-26 DIAGNOSIS — E23.7 PITUITARY ABNORMALITY: ICD-10-CM

## 2024-12-26 DIAGNOSIS — G43.019 INTRACTABLE MIGRAINE WITHOUT AURA AND WITHOUT STATUS MIGRAINOSUS: Primary | ICD-10-CM

## 2024-12-26 PROBLEM — J30.9 ALLERGIC RHINITIS: Status: ACTIVE | Noted: 2024-12-26

## 2024-12-26 RX ORDER — ELETRIPTAN HYDROBROMIDE 40 MG/1
TABLET, FILM COATED ORAL
Qty: 27 TABLET | Refills: 3 | Status: SHIPPED | OUTPATIENT
Start: 2024-12-26

## 2024-12-26 RX ORDER — GALCANEZUMAB 120 MG/ML
120 INJECTION, SOLUTION SUBCUTANEOUS
Qty: 1 ML | Refills: 11 | Status: SHIPPED | OUTPATIENT
Start: 2024-12-26

## 2024-12-26 RX ORDER — CALCIUM CARBONATE 300MG(750)
400 TABLET,CHEWABLE ORAL DAILY
COMMUNITY

## 2024-12-26 NOTE — PROGRESS NOTES
Specialty Pharmacy Patient Management Program  Neurology Initial Assessment     Dora Gr is a 54 y.o. female with Chronic Migraines seen by a Neurology provider and enrolled in the Neurology Patient Management program offered by [facility].  An initial outreach was conducted, including assessment of therapy appropriateness and specialty medication education for Emgality & Nurtec PRN. The patient was introduced to services offered by [facility], including: regular assessments, refill coordination, curbside pick-up or mail order delivery options, prior authorization maintenance, and financial assistance programs as applicable. The patient was also provided with contact information for the pharmacy team.     Insurance Coverage & Financial Support  [employer]- [employer]-    Relevant Past Medical History and Comorbidities  Relevant medical history and concomitant health conditions were discussed with the patient. The patient's chart has been reviewed for relevant past medical history and comorbid health conditions and updated as necessary.   Past Medical History:   Diagnosis Date    Allergic     seasonal    Brain concussion 1988    Cluster headache 2022    Only on left side around eye and will come back each day for three to five days    Headache teenager    Headache, tension-type     Hyperlipidemia     Migraine     UTI ( + E. coli) March 2019 03/20/2019    Vision loss     I wear glasses     Social History     Socioeconomic History    Marital status:    Tobacco Use    Smoking status: Never    Smokeless tobacco: Never   Vaping Use    Vaping status: Never Used   Substance and Sexual Activity    Alcohol use: Yes     Alcohol/week: 2.0 standard drinks of alcohol     Types: 2 Glasses of wine per week    Drug use: No    Sexual activity: Yes     Partners: Male     Problem list reviewed by Xander Love, PharmD on 12/26/2024 at 11:03  AM    Allergies  Known allergies and reactions were discussed with the patient. The patient's chart has been reviewed for  allergy information and updated as necessary.   No Known Allergies  Allergies reviewed by Xander Love PharmD on 12/26/2024 at 11:03 AM    Relevant Laboratory Values      Lab Assessment  N/A.     Current Medication List  This medication list has been reviewed with the patient and evaluated for any interactions or necessary modifications/recommendations, and updated to include all prescription medications, OTC medications, and supplements the patient is currently taking.  This list reflects what is contained in the patient's profile, which has also been marked as reviewed to communicate to other providers it is the most up to date version of the patient's current medication therapy.     Current Outpatient Medications:     eletriptan (RELPAX) 40 MG tablet, Take 1 tablet at onset of migraine of migraine as needed, may repeat once in 2 hours if needed, Disp: 27 tablet, Rfl: 3    Estradiol (Estrogel) 0.75 MG/1.25 GM (0.06%) topical gel, Place 2.5 g on the skin as directed by provider Daily. Use 2 applications nightly, Disp: 300 g, Rfl: 3    galcanezumab-gnlm (EMGALITY) 120 MG/ML auto-injector pen, Inject 1 mL under the skin into the appropriate area as directed Every 30 (Thirty) Days., Disp: 1.12 mL, Rfl: 1    galcanezumab-gnlm (Emgality) 120 MG/ML auto-injector pen, Inject 1 PEN ( 1 mL)under the skin into the appropriate area as directed Every 30 Days. Maintenance Dose= 1 pen., Disp: 1 mL, Rfl: 11    Magnesium 400 MG tablet, Take 400 mg by mouth Daily., Disp: , Rfl:     ondansetron ODT (ZOFRAN-ODT) 8 MG disintegrating tablet, Place 1 tablet on the tongue Every 8 (Eight) Hours As Needed for Nausea or Vomiting., Disp: 6 tablet, Rfl: 2    Progesterone (Prometrium) 200 MG capsule, Take 1 capsule by mouth Daily., Disp: 90 capsule, Rfl: 3    Rimegepant Sulfate (Nurtec) 75 MG tablet dispersible  tablet, Take 1 tablet by mouth Daily As Needed (migraines)., Disp: 24 tablet, Rfl: 3    rimegepant sulfate (Nurtec) 75 MG tablet, Take 1 tablet by mouth Daily As Needed at the onset of headache, Max of 1 tab/24 hr, Max of 18 tabs/30 days., Disp: 16 tablet, Rfl: 11    Medicines reviewed by Xander Love, PharmD on 12/26/2024 at 11:03 AM    Drug Interactions  None     Initial Education Provided for Specialty Medication  The patient has been provided with the following education and any applicable administration techniques (i.e. self-injection) have been demonstrated for the therapies indicated. All questions and concerns have been addressed prior to the patient receiving the medication, and the patient has verbalized understanding of the education and any materials provided.  Additional patient education shall be provided and documented upon request by the patient, provider or payer.              Emgality (Galcanezumab-gnlm)        Medication Expectations   Why am I taking this medication? You are taking this medication for migraine prophylaxis.   What should I expect while on this medication? You should expect to a decrease in the frequency and severity of your migraines.   How does the medication work? Emgality is a monoclonal antibody that binds to calcitonin gene-related peptide (CGRP) and blocks its binding to the receptor decreasing the severity of migraines.   How long will I be on this medication for? The amount of time you will be on this medication will be determined by your doctor and your response to the medication.    How do I take this medication? Take as directed on your prescription label. This medication is a self-injection given every 28 days.    What are some possible side effects? Injection site reactions and hypersensitivity reactions.   What happens if I miss a dose? If you miss a dose, take it as soon as you remember, and time next dose 28 days from last dose.                  Medication  Safety   What are things I should warn my doctor immediately about? Hypersensitivity reactions.   What are things that I should be cautious of? Injection site reaction.   What are some medications that can interact with this one? No drug interactions identified.            Medication Storage/Handling   How should I handle this medication? Keep this medication our of reach of pets/children in original container.  On the day your Emgality is due let it set at room temperature for 30 minutes prior to injection. (do NOT warm using a heat source such as hot water or a microwave).  Administer in the abdomen, thigh, back of the upper arm, or buttocks.  Do not inject where the skin is tender, bruised, red or hard.  Rotate injection sites.   How does this medication need to be stored? Store in refrigerator and keep dry.   How should I dispose of this medication? You can dispose of the empty syringe in a sharps container, and if this is not available you may use an empty hard plastic container such as a milk jug or tide container.            Resources/Support   How can I remind myself to take this medication? You can download a reminder yani on your phone or use a calandar  to help with your monthly injection.   Is financial support available?  Yes, [employer] can provide co-pay cards if you have commercial insurance or patient assistance if you have Medicare or no insurance.    Which vaccines are recommended for me? Talk to your doctor about these vaccines: Flu, Coronavirus (COVID-19), Pneumococcal (pneumonia), Tdap, Hepatitis B, Zoster (shingles)             Nurtec (rimegepant)  Medication Expectations   Why am I taking this medication? You are taking this medication for migraine prophylaxis or to treat an acute migraine.   What should I expect while on this medication? You should expect to see a decrease in the frequency and severity of your migraines.   How does the medication work? Nurtec is a monoclonal antibody that binds to  calcitonin gene-related peptide (CGRP) and blocks its binding to the receptor decreasing the severity of migraines.   How long will I be on this medication for? The amount of time you will be on this medication will be determined by your doctor and your response to the medication.    How do I take this medication? Take as directed on your prescription label.   What are some possible side effects? Potential side effects including, but not limited to nausea. Pt verbalized understanding.   What happens if I miss a dose? Take the missed dose as soon as possible, and resume the every other day timed from the last dose..     Medication Safety   What are things I should warn my doctor immediately about? Hypersensitivity reactions - trouble breathing or swallowing.   What are things that I should be cautious of? Hypersensitivity reactions (eg, dyspnea, rash), including delayed serious reactions, have occurred; discontinue use if suspected    What are some medications that can interact with this one? Avoid concomitant administration of Nurtec ODT with strong inhibitors of CY, strong or moderate inducers of CYP3A or inhibitors of P-gp or BCRP. Avoid another dose of Nurtec ODT within 48 hours when it is administered with moderate inhibitors of CY.  Ask your pharmacist or health care provider before starting new medications     Medication Storage/Handling   How should I handle this medication? Keep this medication out of reach of pets/children in original container. Ensure hands are dry before opening blister pack.   How does this medication need to be stored? Store at room temperature away from heat/cold, sunlight or moisture   How should I dispose of this medication? There should not be a need to dispose of this medication unless your provider decides to change the dose or therapy. If that is the case, take to your local police station for proper disposal. Some pharmacies also have take-back bins for medication  drop-off.      Resources/Support   How can I remind myself to take this medication? You can download reminder apps to help you manage your refills. You may also set an alarm on your phone to remind you. The pharmacy carries pill boxes that you can place next to an area you pass everyday (such as where you place your car keys or where you charge your phone)   Is financial support available?  Yes, [employer] can provide co-pay cards if you have commercial insurance or patient assistance if you have Medicare or no insurance.    Which vaccines are recommended for me? Talk to your doctor about these vaccines: Flu, Coronavirus (COVID-19), Pneumococcal (pneumonia), Tdap, Hepatitis B, Zoster (shingles)       Enter Specific Medication SmartPhrase Here    Adherence and Self-Administration  Adherence related to the patient's specialty therapy was discussed with the patient. The Adherence segment of this outreach has been reviewed and updated.   Is there a concern with patient's ability to self administer the medication correctly and without issue?: No  Were any potential barriers to adherence identified during the initial assessment or patient education?: No  Are there any concerns regarding the patient's understanding of the importance of medication adherence?: No  Methods for Supporting Patient Adherence and/or Self-Administration: I advised the patient to also follow the instructions that come with the box and to watch the patient friendly video on [employer] to help refresh their memory on the day of administration. I advised patient to set a reminder on their phone or calendar for the administration date of Emgality. This will help maintain proper adherence, prevent gaps in therapy, and prevent the risk of break through migraines.    Goals of Therapy  Goals related to the patient's specialty therapy were discussed with the patient. The Patient Goals segment of this outreach has been reviewed and updated.    Goals Addressed Today        Specialty Pharmacy General Goal      Decrease frequency, severity and duration of migraine attacks.    On Average, Reduce:   Frequency of migraines to 5 per month.   Symptom severity by 50% within 1 hour of taking acute therapy.   Duration of migraines to several hours    Baseline Values/Notes on Enrollment  Frequency: 10/30 migraine days/month  Symptom Severity: Severe  Duration: 4+ hours    Date of Reassessment Notes on Progress Toward Above Goals   MM/DD/YY                                                         Reassessment Plan & Follow-Up  Medication Therapy Changes: New start- Emgality loading dose (2 pens) x 1 dose, maintenance dose (1 pen) subq monthly, Nurtec PRN- take 1 tab po prn onset of migraine (max 1 tab/day)  Related Plans, Therapy Recommendations, or Therapy Problems to Be Addressed: Pt is to start Emgality for migraine prevention and has already been prescribed Nurtec as needed for treatment of migraines. Pt was given the Emgality loading dose (2 pens) in the office on 12/26/24 by the provider and we will fill her maintenance dose (1 pen) from [facility] moving forward. The patient previously received Nurtec via [employer] [facility] and I called to cancel this prescription. We discussed the medication in depth and I demonstrated proper administration technique while on the video call. I advised the patient to also follow the instructions that come with the box and to watch the patient friendly video on [employer] to help refresh their memory on the day of administration. I advised patient to set a reminder on their phone or calendar for the administration date of Emgality. This will help maintain proper adherence, prevent gaps in therapy, and prevent the risk of break through migraines. We discussed proper administration technique (rotate sites each month on abdomen or thigh), storage (store in the fridge until day of use and then take out and leave at  "room temperature for at least 1-2 hours before injecting) and disposal (place in red sharp's container or use an old milk jug and write \"Hazardous Waste\" on it and take to the 's department on a drug take back day). We discussed side effects such as injection site pain, redness, swelling and GI side effects such nausea and constipation. If the patient experiences any injection site redness or swelling, they can take an antihistamine like Benadryl, Claritin, or Zyrtec (assuming no allergies to these medications) and could place an ice pack on that area as needed. If the nausea or constipation become unbearable,  I advised them to call the office and the provider may prescribe an antiemetic like Zofran or a stimulant laxative to help with this issue. The patient will also need to complete a trial of Emgality for at least 3 months to see full benefit, unless the side effects are too great. We'll then look at alternative medications for migraine prevention.  She has tried and failed medications for migraine prevention for 3 months or greater: Examples: Topamax without efficacy, Maxalt with significantly unpleasant side effects, lethargy, tightness, Nurtec with good efficacy, NSAIDs, Tylenol etc. Massage every 2 weeks. Lastly, we discussed the mail out process and we will fill the medications through [facility] and ship via [employer] next day air. Ship Thursday 12/26/24, deliver Friday 12/27/24 to HOME address. Patient acknowledged and will call the office with any questions or concerns in the future.   Pharmacist to perform regular reassessments no more than (6) months from the previous assessment.  Care Coordinator to set up future refill outreaches, coordinate prescription delivery, and escalate clinical questions to pharmacist.   Welcome information and patient satisfaction survey to be sent by specialty pharmacy team with patient's initial fill.    Attestation  Therapeutic appropriateness: Appropriate   I attest " the patient was actively involved in and has agreed to the above plan of care. If the prescribed therapy is at any point deemed not appropriate based on the current or future assessments, a consultation will be initiated with the patient's specialty care provider to determine the best course of action. The revised plan of therapy will be documented along with any additional patient education provided. Discussed aforementioned material with patient via telemedicine.    Xander Love PharmD  Clinic Specialty Pharmacist, Neurology  12/26/2024  11:05 EST

## 2024-12-26 NOTE — PATIENT INSTRUCTIONS
Emgality monthly for prevention-loading dose and then once a month.    Nurtec as needed for migraines. Max 1 a day. Max 16 a month.    Eletriptan 1 at onset of migraine, can repeat in 2 hours. Max 2 a day. Max 9 a month.    Magnesium Oxide 400mg 1-2 nightly OR Magnesium Glycinate 250mg 1-2 nightly, Riboflavin 200mg daily and COQ10 200mg daily.    Peppermint and Lavender essential oil-Revive Brand

## 2024-12-26 NOTE — LETTER
December 26, 2024     Fely Nathan PA-C  [address] [facility]  [address]  [facility] [address]    Patient: Dora Gr   YOB: 1970   Date of Visit: 12/26/2024       Dear Fely Nathan PA-C,    Thank you for referring Dora Gr to me for evaluation. Below is a copy of my consult note.    If you have questions, please do not hesitate to call me. I look forward to following Dora along with you.         Sincerely,        AUSTIN Harmon        CC: No Recipients    Subjective:     Patient ID: Doar Gr is a 54 y.o. female.    CC:   Chief Complaint   Patient presents with   • Migraine     Had since a teenager   • Headache     HPI:   History of Present Illness  This is a pleasant 54-year-old female referred to our clinic by her primary care provider for migraine headaches.     She was evaluated by Dr. 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 A pituitary panel was ordered, and an MRI of the brain with and without IV contrast was planned for 6 months. She is scheduled to have this completed on 05/09/2025 at [facility] and follow up with Dr. [facility], neurosurgery at [facility] on 05/12/2025.    In regard to migraines, she has been taking Nurtec as needed along with Zofran. She also has a prescription for rizatriptan. She has been experiencing migraines since her teenage years, with a noted increase in severity following menopause 7 years ago. She reports at least 10 migraine days per month, with the most severe episode occurring in early September 2024, characterized by excruciating pain and violent vomiting. She has attempted prophylactic treatment with Topamax, but it was ineffective. She has not tried amitriptyline, propranolol, or nadolol. Rizatriptan has been part of her treatment regimen for several years, but she has not used Imitrex. She reports that rizatriptan initially provided relief,  but she experienced side effects including fatigue, polydipsia, dry mouth, and dysphagia. She has a prescription for Nurtec, limited to 8 tablets per month for a duration of 6 months. Over the past few years, her headache pattern has changed from right-sided pain to pain across the left maxillary region and left periorbital region. Her migraines are often preceded by staring, eye heaviness, and shakiness, followed by stabbing & throbbing pain. She occasionally experiences nausea and rarely vomiting, and consistently reports photophobia and phonophobia. Her migraines can last up to 5 days, even with Nurtec. She experienced a 3-day migraine in December 2024, despite taking Nurtec, and found relief only after taking Maxalt. She identifies sleep deprivation, weather changes, and stress as triggers for her migraines. She does not report any vision loss, numbness, tingling, weakness, confusion, or slurred speech, but notes difficulty finding words during migraines. She undergoes regular eye exams and receives biweekly massages. She takes magnesium 400 mg daily and vitamin D supplements during the winter months. She does have some neck and shoulder tension which has been present for years. Denies radicular symptoms.    FAMILY HISTORY  She has a sister and mother who also suffer from migraines.    SOCIAL HISTORY  She works as a .    She experiences around 10/30 headache days per month with at least 6 days being severe migraine headaches. Migraine headaches last more than 4+ hours/day and have been present for greater than 6 consecutive months. Characteristics of migraines include at least 2 of the following: unilateral, pulsating, moderate to severe intensity, worsened by physical exertion, photophobia, phonophobia, nausea and/or vomiting.     She has tried and failed medications for migraine prevention for 3 months or greater: Examples: Topamax without efficacy, Maxalt with significantly unpleasant side  effects, lethargy, tightness, Nurtec with good efficacy, NSAIDs, Tylenol etc. Massage every 2 weeks.     The following portions of the patient's history were reviewed and updated as appropriate: allergies, current medications, past family history, past medical history, past social history, past surgical history, and problem list.    Past Medical History:   Diagnosis Date   • Allergic     seasonal   • Brain concussion 1988   • Cluster headache 2022    Only on left side around eye and will come back each day for three to five days   • Headache teenager   • Headache, tension-type    • Hyperlipidemia    • Migraine    • UTI ( + E. coli) March 2019 03/20/2019   • Vision loss     I wear glasses       Past Surgical History:   Procedure Laterality Date   • NO PAST SURGERIES         Social History     Socioeconomic History   • Marital status:    Tobacco Use   • Smoking status: Never   • Smokeless tobacco: Never   Vaping Use   • Vaping status: Never Used   Substance and Sexual Activity   • Alcohol use: Yes     Alcohol/week: 2.0 standard drinks of alcohol     Types: 2 Glasses of wine per week   • Drug use: No   • Sexual activity: Yes     Partners: Male       Family History   Problem Relation Age of Onset   • Arthritis Mother    • Vision loss Mother    • Migraines Mother    • Heart disease Father    • Lymphoma Father         non-Hodgkins   • Cancer Father    • Migraines Sister    • No Known Problems Brother    • No Known Problems Sister    • Dementia Maternal Grandmother           Current Outpatient Medications:   •  Estradiol (Estrogel) 0.75 MG/1.25 GM (0.06%) topical gel, Place 2.5 g on the skin as directed by provider Daily. Use 2 applications nightly, Disp: 300 g, Rfl: 3  •  ondansetron ODT (ZOFRAN-ODT) 8 MG disintegrating tablet, Place 1 tablet on the tongue Every 8 (Eight) Hours As Needed for Nausea or Vomiting., Disp: 6 tablet, Rfl: 2  •  Progesterone (Prometrium) 200 MG capsule, Take 1 capsule by mouth Daily.,  "Disp: 90 capsule, Rfl: 3  •  Rimegepant Sulfate (Nurtec) 75 MG tablet dispersible tablet, Take 1 tablet by mouth Daily As Needed (migraines)., Disp: 24 tablet, Rfl: 3  •  eletriptan (RELPAX) 40 MG tablet, Take 1 tablet at onset of migraine of migraine as needed, may repeat once in 2 hours if needed, Disp: 27 tablet, Rfl: 3  •  galcanezumab-gnlm (EMGALITY) 120 MG/ML auto-injector pen, Inject 1 mL under the skin into the appropriate area as directed Every 30 (Thirty) Days., Disp: 1.12 mL, Rfl: 1  •  Magnesium 400 MG tablet, Take 400 mg by mouth Daily., Disp: , Rfl:      Review of Systems   Eyes:  Positive for photophobia.   Neurological:  Positive for headaches.   All other systems reviewed and are negative.       Objective:  /70   Pulse 78   Ht 165.1 cm (65\")   Wt 61.6 kg (135 lb 14.4 oz)   SpO2 98%   BMI 22.61 kg/m²     Neurological Exam  Mental Status  Alert. Oriented to person, place, time and situation. Speech is normal. Language is fluent with no aphasia. Attention and concentration are normal. Fund of knowledge is appropriate for level of education.    Cranial Nerves  CN II: Visual acuity is normal. Visual fields full to confrontation.  CN III, IV, VI: Extraocular movements intact bilaterally. Normal lids and orbits bilaterally. Pupils equal round and reactive to light bilaterally.  CN V: Facial sensation is normal.  CN VII: Full and symmetric facial movement.  CN IX, X: Palate elevates symmetrically. Normal gag reflex.  CN XI: Shoulder shrug strength is normal.  CN XII: Tongue midline without atrophy or fasciculations.    Motor  Normal muscle bulk throughout. No fasciculations present. Normal muscle tone. No abnormal involuntary movements. Strength is 5/5 throughout all four extremities.    Sensory  Sensation is intact to light touch, pinprick, vibration and proprioception in all four extremities.    Reflexes  Deep tendon reflexes are 2+ and symmetric in all four " extremities.    Coordination    Finger-to-nose, rapid alternating movements and heel-to-shin normal bilaterally without dysmetria.    Gait  Normal casual, toe, heel and tandem gait. Romberg is absent. Able to rise from chair without using arms.    Physical Exam  Constitutional:       Appearance: Normal appearance.   Eyes:      General: Lids are normal.      Extraocular Movements: Extraocular movements intact.      Pupils: Pupils are equal, round, and reactive to light.   Neck:      Comments:   Mild tenderness reported as chronic right cervico-occipital region, trapezius muscles with tension noted  Musculoskeletal:      Cervical back: No edema, erythema, signs of trauma, rigidity, torticollis or crepitus. Muscular tenderness present. No pain with movement or spinous process tenderness. Normal range of motion.   Neurological:      Mental Status: She is alert.      Motor: Motor strength is normal.     Coordination: Coordination is intact. Romberg sign negative.      Deep Tendon Reflexes: Reflexes are normal and symmetric.   Psychiatric:         Mood and Affect: Mood and affect normal.         Speech: Speech normal.       Results:  Results  Laboratory Studies 11/11/2024-  FSH and LH were within normal limits. Prolactin was within normal limits. Free T4 was normal. TSH was normal. ACTH was normal and insulin like growth factor I was also within normal limits.    Imaging  11/5/2024-MRI of the brain with and without i.v. contrast appears to have prominent cyst in the right aspect of the pituitary gland, otherwise unremarkable MRI of the brain. Reviewed by Neurosurgery 11/11/2024.    Testing  Migraine disability assessment score is a 38 indicating severe disability in the past 3 months.    Assessment/Plan:     Diagnoses and all orders for this visit:    1. Intractable migraine without aura and without status migrainosus (Primary)  Comments:  Initiate Emgality monthly injections for migraine prevention, Nurtec as  needed-approved for 16 per 30 days  Orders:  -     eletriptan (RELPAX) 40 MG tablet; Take 1 tablet at onset of migraine of migraine as needed, may repeat once in 2 hours if needed  Dispense: 27 tablet; Refill: 3    2. Pituitary abnormality  Comments:  follow up with neurosurgery & repeat MRI brain all scheduled 5/2025           Assessment & Plan  Total time of visit today was 40 minutes which included reviewing PCP records, [facility] notes, MRI of the brain imaging and radiology report, labs, obtaining history from Dora, completing exam, discussing findings and recommendations in detail moving forward.    1. Migraine headaches.  She reports a long history of migraines, which have worsened since menopause, with at least 10 migraine days per month. She has tried Topamax without success and has been using rizatriptan and Nurtec as needed. She experiences severe symptoms, including excruciating pain, nausea, and sensitivity to light and sound.     She will start on Emgality injections monthly for migraine prevention. She has been informed about the potential side effects of these medications, including constipation and injection site reactions. She has been advised to monitor for any adverse effects and to report any issues promptly. Our specialty pharmacy team is meeting with her today, and she will have her loading dose today, followed by once-a-month injection for migraine prevention. She will use Nurtec at the onset of migraine as needed, with approval today of 16 per 30 days, along with switching from rizatriptan to eletriptan at the onset of migraine as needed. She will let us know if she tolerates this better, and if not, she can return to the rizatriptan. Also discussed consideration of magnesium oxide 800 mg daily or magnesium glycinate 500 mg daily. She can also take riboflavin 200 mg daily and CoQ10 200 mg daily. She can use peppermint and lavender oil as much as she needs to sites of tenderness.  Recommended Revive brand as this is pure and reasonably priced. She should track her migraines. She verbalizes understanding and agrees with the plan today. She will call with any questions or concerns prior to follow-up in the clinic.    2. Pituitary Abnormality.  She has been advised to continue her scheduled appointment with neurosurgery for a repeat MRI of the brain in May 2025 to evaluate the pituitary cyst. She has been reassured that her lab results are normal, and the cyst is likely an incidental finding.     Follow-up  She will follow up in July 2025 for reevaluation of symptoms or sooner if needed.    Reviewed medications, potential side effects and signs and symptoms to report. Discussed risk versus benefits of treatment plan with patient and/or family-including medications, labs and radiology that may be ordered. Addressed questions and concerns during visit. Patient and/or family verbalized understanding and agree with plan.    During this visit the following were done:  Labs Reviewed [x]    Labs Ordered []    Radiology Reports Reviewed [x]    Radiology Ordered []    PCP Records Reviewed [x]    Referring Provider Records Reviewed [x]    ER Records Reviewed []    Hospital Records Reviewed []    History Obtained From Family []    Radiology Images Reviewed [x]    Other Reviewed [x]  [facility] notes reviewed  Records Requested []      12/26/24   08:57 EST    Patient or patient representative verbalized consent for the use of Ambient Listening during the visit with  AUSTIN Harmon for chart documentation. 12/26/2024  10:00 EST    Note to patient: The 21st Century Cures Act makes medical notes like these available to patients in the interest of transparency. However, be advised this is a medical document. It is intended as peer to peer communication. It is written in medical language and may contain abbreviations or verbiage that are unfamiliar. It may appear blunt or direct. Medical  documents are intended to carry relevant information, facts as evident, and the clinical opinion of the provider.

## 2024-12-26 NOTE — PROGRESS NOTES
Subjective:     Patient ID: Dora Gr is a 54 y.o. female.    CC:   Chief Complaint   Patient presents with    Migraine     Had since a teenager    Headache     HPI:   History of Present Illness  This is a pleasant 54-year-old female referred to our clinic by her primary care provider for migraine headaches.     She was evaluated by Dr. 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 A pituitary panel was ordered, and an MRI of the brain with and without IV contrast was planned for 6 months. She is scheduled to have this completed on 05/09/2025 at [facility] and follow up with Dr. [facility], neurosurgery at [facility] on 05/12/2025.    In regard to migraines, she has been taking Nurtec as needed along with Zofran. She also has a prescription for rizatriptan. She has been experiencing migraines since her teenage years, with a noted increase in severity following menopause 7 years ago. She reports at least 10 migraine days per month, with the most severe episode occurring in early September 2024, characterized by excruciating pain and violent vomiting. She has attempted prophylactic treatment with Topamax, but it was ineffective. She has not tried amitriptyline, propranolol, or nadolol. Rizatriptan has been part of her treatment regimen for several years, but she has not used Imitrex. She reports that rizatriptan initially provided relief, but she experienced side effects including fatigue, polydipsia, dry mouth, and dysphagia. She has a prescription for Nurtec, limited to 8 tablets per month for a duration of 6 months. Over the past few years, her headache pattern has changed from right-sided pain to pain across the left maxillary region and left periorbital region. Her migraines are often preceded by staring, eye heaviness, and shakiness, followed by stabbing & throbbing pain. She occasionally experiences nausea and rarely vomiting, and  consistently reports photophobia and phonophobia. Her migraines can last up to 5 days, even with Nurtec. She experienced a 3-day migraine in December 2024, despite taking Nurtec, and found relief only after taking Maxalt. She identifies sleep deprivation, weather changes, and stress as triggers for her migraines. She does not report any vision loss, numbness, tingling, weakness, confusion, or slurred speech, but notes difficulty finding words during migraines. She undergoes regular eye exams and receives biweekly massages. She takes magnesium 400 mg daily and vitamin D supplements during the winter months. She does have some neck and shoulder tension which has been present for years. Denies radicular symptoms.    FAMILY HISTORY  She has a sister and mother who also suffer from migraines.    SOCIAL HISTORY  She works as a .    She experiences around 10/30 headache days per month with at least 6 days being severe migraine headaches. Migraine headaches last more than 4+ hours/day and have been present for greater than 6 consecutive months. Characteristics of migraines include at least 2 of the following: unilateral, pulsating, moderate to severe intensity, worsened by physical exertion, photophobia, phonophobia, nausea and/or vomiting.     She has tried and failed medications for migraine prevention for 3 months or greater: Examples: Topamax without efficacy, Maxalt with significantly unpleasant side effects, lethargy, tightness, Nurtec with good efficacy, NSAIDs, Tylenol etc. Massage every 2 weeks.     The following portions of the patient's history were reviewed and updated as appropriate: allergies, current medications, past family history, past medical history, past social history, past surgical history, and problem list.    Past Medical History:   Diagnosis Date    Allergic     seasonal    Brain concussion 1988    Cluster headache 2022    Only on left side around eye and will come back each day for  three to five days    Headache teenager    Headache, tension-type     Hyperlipidemia     Migraine     UTI ( + E. coli) March 2019 03/20/2019    Vision loss     I wear glasses       Past Surgical History:   Procedure Laterality Date    NO PAST SURGERIES         Social History     Socioeconomic History    Marital status:    Tobacco Use    Smoking status: Never    Smokeless tobacco: Never   Vaping Use    Vaping status: Never Used   Substance and Sexual Activity    Alcohol use: Yes     Alcohol/week: 2.0 standard drinks of alcohol     Types: 2 Glasses of wine per week    Drug use: No    Sexual activity: Yes     Partners: Male       Family History   Problem Relation Age of Onset    Arthritis Mother     Vision loss Mother     Migraines Mother     Heart disease Father     Lymphoma Father         non-Hodgkins    Cancer Father     Migraines Sister     No Known Problems Brother     No Known Problems Sister     Dementia Maternal Grandmother           Current Outpatient Medications:     Estradiol (Estrogel) 0.75 MG/1.25 GM (0.06%) topical gel, Place 2.5 g on the skin as directed by provider Daily. Use 2 applications nightly, Disp: 300 g, Rfl: 3    ondansetron ODT (ZOFRAN-ODT) 8 MG disintegrating tablet, Place 1 tablet on the tongue Every 8 (Eight) Hours As Needed for Nausea or Vomiting., Disp: 6 tablet, Rfl: 2    Progesterone (Prometrium) 200 MG capsule, Take 1 capsule by mouth Daily., Disp: 90 capsule, Rfl: 3    Rimegepant Sulfate (Nurtec) 75 MG tablet dispersible tablet, Take 1 tablet by mouth Daily As Needed (migraines)., Disp: 24 tablet, Rfl: 3    eletriptan (RELPAX) 40 MG tablet, Take 1 tablet at onset of migraine of migraine as needed, may repeat once in 2 hours if needed, Disp: 27 tablet, Rfl: 3    galcanezumab-gnlm (EMGALITY) 120 MG/ML auto-injector pen, Inject 1 mL under the skin into the appropriate area as directed Every 30 (Thirty) Days., Disp: 1.12 mL, Rfl: 1    Magnesium 400 MG tablet, Take 400 mg by mouth  "Daily., Disp: , Rfl:      Review of Systems   Eyes:  Positive for photophobia.   Neurological:  Positive for headaches.   All other systems reviewed and are negative.       Objective:  /70   Pulse 78   Ht 165.1 cm (65\")   Wt 61.6 kg (135 lb 14.4 oz)   SpO2 98%   BMI 22.61 kg/m²     Neurological Exam  Mental Status  Alert. Oriented to person, place, time and situation. Speech is normal. Language is fluent with no aphasia. Attention and concentration are normal. Fund of knowledge is appropriate for level of education.    Cranial Nerves  CN II: Visual acuity is normal. Visual fields full to confrontation.  CN III, IV, VI: Extraocular movements intact bilaterally. Normal lids and orbits bilaterally. Pupils equal round and reactive to light bilaterally.  CN V: Facial sensation is normal.  CN VII: Full and symmetric facial movement.  CN IX, X: Palate elevates symmetrically. Normal gag reflex.  CN XI: Shoulder shrug strength is normal.  CN XII: Tongue midline without atrophy or fasciculations.    Motor  Normal muscle bulk throughout. No fasciculations present. Normal muscle tone. No abnormal involuntary movements. Strength is 5/5 throughout all four extremities.    Sensory  Sensation is intact to light touch, pinprick, vibration and proprioception in all four extremities.    Reflexes  Deep tendon reflexes are 2+ and symmetric in all four extremities.    Coordination    Finger-to-nose, rapid alternating movements and heel-to-shin normal bilaterally without dysmetria.    Gait  Normal casual, toe, heel and tandem gait. Romberg is absent. Able to rise from chair without using arms.    Physical Exam  Constitutional:       Appearance: Normal appearance.   Eyes:      General: Lids are normal.      Extraocular Movements: Extraocular movements intact.      Pupils: Pupils are equal, round, and reactive to light.   Neck:      Comments:   Mild tenderness reported as chronic right cervico-occipital region, trapezius muscles " with tension noted  Musculoskeletal:      Cervical back: No edema, erythema, signs of trauma, rigidity, torticollis or crepitus. Muscular tenderness present. No pain with movement or spinous process tenderness. Normal range of motion.   Neurological:      Mental Status: She is alert.      Motor: Motor strength is normal.     Coordination: Coordination is intact. Romberg sign negative.      Deep Tendon Reflexes: Reflexes are normal and symmetric.   Psychiatric:         Mood and Affect: Mood and affect normal.         Speech: Speech normal.       Results:  Results  Laboratory Studies 11/11/2024-  FSH and LH were within normal limits. Prolactin was within normal limits. Free T4 was normal. TSH was normal. ACTH was normal and insulin like growth factor I was also within normal limits.    Imaging  11/5/2024-MRI of the brain with and without i.v. contrast appears to have prominent cyst in the right aspect of the pituitary gland, otherwise unremarkable MRI of the brain. Reviewed by Neurosurgery 11/11/2024.    Testing  Migraine disability assessment score is a 38 indicating severe disability in the past 3 months.    Assessment/Plan:     Diagnoses and all orders for this visit:    1. Intractable migraine without aura and without status migrainosus (Primary)  Comments:  Initiate Emgality monthly injections for migraine prevention, Nurtec as needed-approved for 16 per 30 days  Orders:  -     eletriptan (RELPAX) 40 MG tablet; Take 1 tablet at onset of migraine of migraine as needed, may repeat once in 2 hours if needed  Dispense: 27 tablet; Refill: 3    2. Pituitary abnormality  Comments:  follow up with neurosurgery & repeat MRI brain all scheduled 5/2025           Assessment & Plan  Total time of visit today was 40 minutes which included reviewing PCP records, [facility] Neurosurgery notes, MRI of the brain imaging and radiology report, labs, obtaining history from Dora, completing exam, discussing findings and recommendations  in detail moving forward.    1. Migraine headaches.  She reports a long history of migraines, which have worsened since menopause, with at least 10 migraine days per month. She has tried Topamax without success and has been using rizatriptan and Nurtec as needed. She experiences severe symptoms, including excruciating pain, nausea, and sensitivity to light and sound.     She will start on Emgality injections monthly for migraine prevention. She has been informed about the potential side effects of these medications, including constipation and injection site reactions. She has been advised to monitor for any adverse effects and to report any issues promptly. Our specialty pharmacy team is meeting with her today, and she will have her loading dose today, followed by once-a-month injection for migraine prevention. She will use Nurtec at the onset of migraine as needed, with approval today of 16 per 30 days, along with switching from rizatriptan to eletriptan at the onset of migraine as needed. She will let us know if she tolerates this better, and if not, she can return to the rizatriptan. Also discussed consideration of magnesium oxide 800 mg daily or magnesium glycinate 500 mg daily. She can also take riboflavin 200 mg daily and CoQ10 200 mg daily. She can use peppermint and lavender oil as much as she needs to sites of tenderness. Recommended Revive brand as this is pure and reasonably priced. She should track her migraines. She verbalizes understanding and agrees with the plan today. She will call with any questions or concerns prior to follow-up in the clinic.    2. Pituitary Abnormality.  She has been advised to continue her scheduled appointment with neurosurgery for a repeat MRI of the brain in May 2025 to evaluate the pituitary cyst. She has been reassured that her lab results are normal, and the cyst is likely an incidental finding.     Follow-up  She will follow up in July 2025 for reevaluation of symptoms  or sooner if needed.    Reviewed medications, potential side effects and signs and symptoms to report. Discussed risk versus benefits of treatment plan with patient and/or family-including medications, labs and radiology that may be ordered. Addressed questions and concerns during visit. Patient and/or family verbalized understanding and agree with plan.    During this visit the following were done:  Labs Reviewed [x]    Labs Ordered []    Radiology Reports Reviewed [x]    Radiology Ordered []    PCP Records Reviewed [x]    Referring Provider Records Reviewed [x]    ER Records Reviewed []    Hospital Records Reviewed []    History Obtained From Family []    Radiology Images Reviewed [x]    Other Reviewed [x]  [facility] notes reviewed  Records Requested []      12/26/24   08:57 EST    Patient or patient representative verbalized consent for the use of Ambient Listening during the visit with  AUSTIN Harmon for chart documentation. 12/26/2024  10:00 EST    Note to patient: The 21st Century Cures Act makes medical notes like these available to patients in the interest of transparency. However, be advised this is a medical document. It is intended as peer to peer communication. It is written in medical language and may contain abbreviations or verbiage that are unfamiliar. It may appear blunt or direct. Medical documents are intended to carry relevant information, facts as evident, and the clinical opinion of the provider.

## 2024-12-27 ENCOUNTER — PATIENT ROUNDING (BHMG ONLY) (OUTPATIENT)
Dept: NEUROLOGY | Facility: CLINIC | Age: 54
End: 2024-12-27
Payer: OTHER GOVERNMENT

## 2025-01-21 ENCOUNTER — SPECIALTY PHARMACY (OUTPATIENT)
Dept: NEUROLOGY | Facility: CLINIC | Age: 55
End: 2025-01-21
Payer: OTHER GOVERNMENT

## 2025-01-21 NOTE — PROGRESS NOTES
Specialty Pharmacy Refill Coordination Note     Dora is a 54 y.o. female contacted today regarding refills of  Emgality & Nurtec specialty medication(s).    Reviewed and verified with patient: yes  Specialty medication(s) and dose(s) confirmed: yes    Refill Questions      Flowsheet Row Most Recent Value   Changes to allergies? No   Changes to medications? No   New conditions or infections since last clinic visit No   Unplanned office visit, urgent care, ED, or hospital admission in the last 4 weeks  No   How does patient/caregiver feel medication is working? Very good   Financial problems or insurance changes  No   Since the previous refill, were any specialty medication doses or scheduled injections missed or delayed?  No   Does this patient require a clinical escalation to a pharmacist? No            Delivery Questions      Flowsheet Row Most Recent Value   Delivery method UPS   Delivery address verified with patient/caregiver? Yes   Delivery address Home   Number of medications in delivery 2   Medication(s) being filled and delivered Galcanezumab-gnlm (Emgality), Rimegepant Sulfate (NURTEC)   Doses left of specialty medications Emgality-0 Nurtec-4   Copay verified? Yes   Copay amount 86.00   Copay form of payment Credit/debit on file   Ship Date 1/22   Delivery Date Selection 01/23/25   Signature Required No                   Follow-up: 30 day(s)     Lela Rollins, Pharmacy Technician  Specialty Pharmacy Technician

## 2025-02-14 ENCOUNTER — SPECIALTY PHARMACY (OUTPATIENT)
Dept: NEUROLOGY | Facility: CLINIC | Age: 55
End: 2025-02-14
Payer: OTHER GOVERNMENT

## 2025-02-14 NOTE — PROGRESS NOTES
Specialty Pharmacy Patient Management Program  Refill Outreach     Dora was contacted today regarding refills of their medication(s). Patient is due for Emgality injection on 3/26. Pt did not need Nurtec this month.      Refill Questions      Flowsheet Row Most Recent Value   Changes to allergies? No   Changes to medications? No   New conditions or infections since last clinic visit No   Unplanned office visit, urgent care, ED, or hospital admission in the last 4 weeks  No   How does patient/caregiver feel medication is working? Very good   Financial problems or insurance changes  No   Since the previous refill, were any specialty medication doses or scheduled injections missed or delayed?  No   Does this patient require a clinical escalation to a pharmacist? No            Delivery Questions      Flowsheet Row Most Recent Value   Delivery method UPS   Delivery address verified with patient/caregiver? Yes   Delivery address Home   Number of medications in delivery 1   Medication(s) being filled and delivered Galcanezumab-gnlm (Emgality)   Doses left of specialty medications emgality-1 Nurtec-10   Copay verified? Yes   Copay amount 43.00   Copay form of payment Credit/debit on file   Ship Date 2/17   Delivery Date Selection 02/18/25   Signature Required No                 Follow-up: 30 day(s)     Lela Rollins, Pharmacy Technician  2/14/2025  11:54 EST

## 2025-03-24 ENCOUNTER — SPECIALTY PHARMACY (OUTPATIENT)
Dept: GENERAL RADIOLOGY | Facility: HOSPITAL | Age: 55
End: 2025-03-24
Payer: OTHER GOVERNMENT

## 2025-03-24 RX ORDER — GALCANEZUMAB 120 MG/ML
120 INJECTION, SOLUTION SUBCUTANEOUS
Qty: 3 ML | Refills: 3 | Status: SHIPPED | OUTPATIENT
Start: 2025-03-24

## 2025-03-24 NOTE — PROGRESS NOTES
Specialty Pharmacy Patient Management Program  One-Time Clinical Outreach     Dora Gr is a 54 y.o. female seen by a Neurology provider for Chronic Migraine and enrolled in the External Neurology Patient Management program offered by [facility].      The pharmacy care coordinator contacted the patient for refills of her medications, but she received a letter in the mail from her insurance stating she can get her medications cheaper through mail order (Emgality 90 day supply=$38). She wanted the prescriptions transferred. The patient has been disenrolled and enrolled in the external chronic migraine program. The patient has [facility]  and she is only allowed 2 fills at [facility] [place] and then must fill mail order. I sent the Emgality and Nurtec rxs to [facility] on 3/24/25. The pharmacy should reach out to the patient to coordinate a ship/delivery date.     Xander Love, PharmD  Clinic Specialty Pharmacist, Neurology  3/24/2025  13:37 EDT

## 2025-04-21 ENCOUNTER — TELEPHONE (OUTPATIENT)
Dept: FAMILY MEDICINE CLINIC | Facility: CLINIC | Age: 55
End: 2025-04-21
Payer: OTHER GOVERNMENT

## 2025-04-21 NOTE — TELEPHONE ENCOUNTER
I have several places that I am concerned about.  Every time I get checked, I need deeper removal so I need an all over scan.  I would like to see Sera Souza at [facility], as the practice I used to use has closed. I was able to go ahead and make an appointment on May 12, but will need the referral for [facility] [place].     Thank you,  Dora Gr

## 2025-04-21 NOTE — TELEPHONE ENCOUNTER
Reception please inform pt that fasting labs ordered. Please schedule lab visit for pt. Thank you. Close when done.    Thank you   Xray Chest 1 View- PORTABLE-Urgent

## 2025-04-21 NOTE — TELEPHONE ENCOUNTER
Willow do I need to place a referral in epic for this or is this something specifically you can take care of just through ?

## 2025-04-29 ENCOUNTER — TELEPHONE (OUTPATIENT)
Dept: NEUROSURGERY | Facility: CLINIC | Age: 55
End: 2025-04-29

## 2025-04-29 NOTE — TELEPHONE ENCOUNTER
[facility] received a fax that requires your attention. The document has been indexed to the patient’s chart for your review.      Reason for sending: RECEIVED  MR BRAIN W/WO AUTH    Documents Description:  MR BRAIN W/WO AUTH_[employer] [facility]_04/25/25    Name of Sender: [employer]     Date Indexed: 04/29/25    Notes (if needed):

## 2025-05-05 ENCOUNTER — TELEPHONE (OUTPATIENT)
Dept: NEUROLOGY | Facility: CLINIC | Age: 55
End: 2025-05-05
Payer: OTHER GOVERNMENT

## 2025-05-09 ENCOUNTER — HOSPITAL ENCOUNTER (OUTPATIENT)
Facility: HOSPITAL | Age: 55
Discharge: HOME | End: 2025-05-09
Admitting: NEUROLOGICAL SURGERY
Payer: OTHER GOVERNMENT

## 2025-05-09 DIAGNOSIS — E23.6 PITUITARY CYST: ICD-10-CM

## 2025-05-09 PROCEDURE — 25510000002 GADOBENATE DIMEGLUMINE 529 MG/ML SOLUTION: Performed by: NEUROLOGICAL SURGERY

## 2025-05-09 PROCEDURE — A9577 INJ MULTIHANCE: HCPCS | Performed by: NEUROLOGICAL SURGERY

## 2025-05-09 PROCEDURE — 70553 MRI BRAIN STEM W/O & W/DYE: CPT

## 2025-05-09 RX ADMIN — GADOBENATE DIMEGLUMINE 12 ML: 529 INJECTION, SOLUTION INTRAVENOUS at 09:56

## 2025-05-19 ENCOUNTER — OFFICE VISIT (OUTPATIENT)
Dept: NEUROSURGERY | Facility: CLINIC | Age: 55
End: 2025-05-19
Payer: OTHER GOVERNMENT

## 2025-05-19 VITALS — HEIGHT: 65 IN | WEIGHT: 140.7 LBS | BODY MASS INDEX: 23.44 KG/M2 | TEMPERATURE: 98.2 F

## 2025-05-19 DIAGNOSIS — E23.6 PITUITARY CYST: Primary | ICD-10-CM

## 2025-05-19 PROCEDURE — 99213 OFFICE O/P EST LOW 20 MIN: CPT | Performed by: NEUROLOGICAL SURGERY

## 2025-05-19 NOTE — PROGRESS NOTES
Subjective     Chief Complaint: Follow-up abnormal brain MRI    Patient ID: Dora Gr is a 54 y.o. female is here today for follow-up.    History of Present Illness    This is a 54-year-old woman who underwent an MRI of the brain during workup for sudden onset headache late last year.  She was found to have an abnormality in the pituitary gland.  Although the imaging findings were benign, I recommended a short-term follow-up MRI, and she presents today to discuss the results of this study.  She denies any significant change or progression in her symptoms since the last time she was seen.  She denies any sudden onset or thunderclap headaches.    The following portions of the patient's history were reviewed and updated as appropriate: allergies, current medications, past family history, past medical history, past social history, past surgical history and problem list.    Family history:   Family History   Problem Relation Age of Onset    Arthritis Mother     Vision loss Mother     Migraines Mother     Heart disease Father     Lymphoma Father         non-Hodgkins    Cancer Father     Migraines Sister     No Known Problems Brother     No Known Problems Sister     Dementia Maternal Grandmother        Social history:   Social History     Socioeconomic History    Marital status:    Tobacco Use    Smoking status: Never     Passive exposure: Never    Smokeless tobacco: Never   Vaping Use    Vaping status: Never Used   Substance and Sexual Activity    Alcohol use: Yes     Alcohol/week: 2.0 standard drinks of alcohol     Types: 2 Glasses of wine per week    Drug use: No    Sexual activity: Yes     Partners: Male       Review of Systems   Constitutional:  Negative for activity change, appetite change, chills, diaphoresis, fatigue, fever and unexpected weight change.   HENT:  Negative for congestion, dental problem, drooling, ear discharge, ear pain, facial swelling, hearing loss, mouth sores, nosebleeds, postnasal  "drip, rhinorrhea, sinus pressure, sinus pain, sneezing, sore throat, tinnitus, trouble swallowing and voice change.    Eyes:  Negative for photophobia, pain, discharge, redness, itching and visual disturbance.   Respiratory:  Negative for apnea, cough, choking, chest tightness, shortness of breath, wheezing and stridor.    Cardiovascular:  Negative for chest pain, palpitations and leg swelling.   Gastrointestinal:  Negative for abdominal distention, abdominal pain, anal bleeding, blood in stool, constipation, diarrhea, nausea, rectal pain and vomiting.   Endocrine: Negative for cold intolerance, heat intolerance, polydipsia, polyphagia and polyuria.   Genitourinary:  Negative for decreased urine volume, difficulty urinating, dysuria, enuresis, flank pain, frequency, genital sores, hematuria and urgency.   Musculoskeletal:  Negative for arthralgias, back pain, gait problem, joint swelling, myalgias, neck pain and neck stiffness.   Skin:  Negative for color change, pallor, rash and wound.   Allergic/Immunologic: Negative for environmental allergies, food allergies and immunocompromised state.   Neurological:  Negative for dizziness, tremors, seizures, syncope, facial asymmetry, speech difficulty, weakness, light-headedness, numbness and headaches.   Hematological:  Negative for adenopathy. Does not bruise/bleed easily.   Psychiatric/Behavioral:  Negative for agitation, behavioral problems, confusion, decreased concentration, dysphoric mood, hallucinations, self-injury, sleep disturbance and suicidal ideas. The patient is not nervous/anxious and is not hyperactive.        Objective   Temperature 98.2 °F (36.8 °C), temperature source Temporal, height 165.1 cm (65\"), weight 63.8 kg (140 lb 11.2 oz), not currently breastfeeding.  Body mass index is 23.41 kg/m².    Physical Exam  Constitutional:       General: She is not in acute distress.     Appearance: She is well-developed. She is not diaphoretic.   HENT:      Head: " Normocephalic and atraumatic.   Pulmonary:      Effort: Pulmonary effort is normal.   Skin:     General: Skin is warm and dry.   Neurological:      Mental Status: She is alert and oriented to person, place, and time.      Cranial Nerves: No cranial nerve deficit.      Comments: She is alert, pleasant, conversant, and appropriate.  Speech production is fluent with no paraphasic errors.  She denies headache.  Casual gait is unremarkable.         Assessment & Plan     Independent Review of Radiographic Studies:      Available for my review is a MRI of the brain with and without contrast that was performed on 5/9/2025.  A comparison study from 11/5/2024 is also available for my review.  The hypodensity seen in the leftward aspect of the pituitary gland is unchanged.  There are no pathological or concerning enhancement patterns.    Medical Decision Making:      There was some discrepancy on the MRI reports with regard to the laterality of the lesion.  This appears to be a benign cyst situated in the left or aspect of the pituitary gland.  Signs and symptoms of pituitary apoplexy, intracranial hemorrhage, and endocrine abnormalities were once again reviewed with the patient.  I directed her to contact my office with any new or worsening symptoms, otherwise I would recommend a surveillance MRI in 1 year.    Diagnoses and all orders for this visit:    1. Pituitary cyst (Primary)  -     MRI Brain With & Without Contrast; Future        No follow-ups on file.           This document signed by MARCUS Glez MD May 19, 2025 10:01 EDT

## 2025-08-29 ENCOUNTER — OFFICE VISIT (OUTPATIENT)
Dept: NEUROLOGY | Facility: CLINIC | Age: 55
End: 2025-08-29
Payer: OTHER GOVERNMENT

## 2025-08-29 VITALS
DIASTOLIC BLOOD PRESSURE: 78 MMHG | OXYGEN SATURATION: 98 % | WEIGHT: 134.8 LBS | HEIGHT: 65 IN | BODY MASS INDEX: 22.46 KG/M2 | SYSTOLIC BLOOD PRESSURE: 118 MMHG | HEART RATE: 72 BPM

## 2025-08-29 DIAGNOSIS — E23.7 PITUITARY ABNORMALITY: ICD-10-CM

## 2025-08-29 DIAGNOSIS — G43.019 INTRACTABLE MIGRAINE WITHOUT AURA AND WITHOUT STATUS MIGRAINOSUS: Primary | ICD-10-CM

## 2025-08-29 PROCEDURE — 99213 OFFICE O/P EST LOW 20 MIN: CPT | Performed by: NURSE PRACTITIONER

## 2025-08-29 RX ORDER — RIZATRIPTAN BENZOATE 10 MG/1
TABLET, ORALLY DISINTEGRATING ORAL
Qty: 27 TABLET | Refills: 3 | Status: SHIPPED | OUTPATIENT
Start: 2025-08-29

## 2025-08-29 RX ORDER — GALCANEZUMAB 120 MG/ML
120 INJECTION, SOLUTION SUBCUTANEOUS
Qty: 3 ML | Refills: 3 | Status: SHIPPED | OUTPATIENT
Start: 2025-08-29

## 2025-08-29 RX ORDER — MAGNESIUM GLUCONATE 27 MG(500)
500 TABLET ORAL DAILY
COMMUNITY

## 2025-08-29 RX ORDER — ACETAMINOPHEN 160 MG
2000 TABLET,DISINTEGRATING ORAL DAILY
COMMUNITY
Start: 2024-08-08

## 2025-08-29 RX ORDER — MULTIVIT WITH MINERALS/LUTEIN
1000 TABLET ORAL DAILY
COMMUNITY
Start: 2023-08-08